# Patient Record
Sex: FEMALE | Race: BLACK OR AFRICAN AMERICAN | NOT HISPANIC OR LATINO | Employment: UNEMPLOYED | URBAN - METROPOLITAN AREA
[De-identification: names, ages, dates, MRNs, and addresses within clinical notes are randomized per-mention and may not be internally consistent; named-entity substitution may affect disease eponyms.]

---

## 2018-01-19 ENCOUNTER — OFFICE VISIT (OUTPATIENT)
Dept: PULMONOLOGY | Facility: CLINIC | Age: 58
End: 2018-01-19

## 2018-01-19 VITALS
OXYGEN SATURATION: 98 % | TEMPERATURE: 97.9 F | RESPIRATION RATE: 16 BRPM | WEIGHT: 205.6 LBS | HEIGHT: 62 IN | DIASTOLIC BLOOD PRESSURE: 90 MMHG | BODY MASS INDEX: 37.84 KG/M2 | HEART RATE: 70 BPM | SYSTOLIC BLOOD PRESSURE: 146 MMHG

## 2018-01-19 DIAGNOSIS — J45.41 MODERATE PERSISTENT ASTHMA WITH ACUTE EXACERBATION IN ADULT: ICD-10-CM

## 2018-01-19 DIAGNOSIS — R06.02 SOB (SHORTNESS OF BREATH): ICD-10-CM

## 2018-01-19 DIAGNOSIS — R05.8 COUGH WITH SPUTUM: Primary | ICD-10-CM

## 2018-01-19 DIAGNOSIS — Z78.9 NONSMOKER: ICD-10-CM

## 2018-01-19 DIAGNOSIS — J30.89 CHRONIC NON-SEASONAL ALLERGIC RHINITIS, UNSPECIFIED TRIGGER: ICD-10-CM

## 2018-01-19 LAB
ALBUMIN SERPL-MCNC: 3.9 G/DL (ref 3.2–4.8)
ALBUMIN/GLOB SERPL: 1.3 G/DL (ref 1.5–2.5)
ALP SERPL-CCNC: 89 U/L (ref 25–100)
ALT SERPL W P-5'-P-CCNC: 30 U/L (ref 7–40)
ANION GAP SERPL CALCULATED.3IONS-SCNC: 7 MMOL/L (ref 3–11)
AST SERPL-CCNC: 21 U/L (ref 0–33)
BASOPHILS # BLD AUTO: 0.02 10*3/MM3 (ref 0–0.2)
BASOPHILS NFR BLD AUTO: 0.3 % (ref 0–1)
BILIRUB SERPL-MCNC: 0.4 MG/DL (ref 0.3–1.2)
BNP SERPL-MCNC: 32 PG/ML (ref 0–100)
BUN BLD-MCNC: 9 MG/DL (ref 9–23)
BUN/CREAT SERPL: 12.9 (ref 7–25)
CALCIUM SPEC-SCNC: 9.5 MG/DL (ref 8.7–10.4)
CHLORIDE SERPL-SCNC: 107 MMOL/L (ref 99–109)
CO2 SERPL-SCNC: 27 MMOL/L (ref 20–31)
CREAT BLD-MCNC: 0.7 MG/DL (ref 0.6–1.3)
DEPRECATED RDW RBC AUTO: 50.3 FL (ref 37–54)
EOSINOPHIL # BLD AUTO: 0.21 10*3/MM3 (ref 0–0.3)
EOSINOPHIL NFR BLD AUTO: 3.3 % (ref 0–3)
ERYTHROCYTE [DISTWIDTH] IN BLOOD BY AUTOMATED COUNT: 21.1 % (ref 11.3–14.5)
GFR SERPL CREATININE-BSD FRML MDRD: 104 ML/MIN/1.73
GLOBULIN UR ELPH-MCNC: 3 GM/DL
GLUCOSE BLD-MCNC: 92 MG/DL (ref 70–100)
HCT VFR BLD AUTO: 36.6 % (ref 34.5–44)
HGB BLD-MCNC: 11.2 G/DL (ref 11.5–15.5)
HYPOCHROMIA BLD QL: NORMAL
IMM GRANULOCYTES # BLD: 0.02 10*3/MM3 (ref 0–0.03)
IMM GRANULOCYTES NFR BLD: 0.3 % (ref 0–0.6)
LYMPHOCYTES # BLD AUTO: 2.29 10*3/MM3 (ref 0.6–4.8)
LYMPHOCYTES NFR BLD AUTO: 35.7 % (ref 24–44)
MCH RBC QN AUTO: 20.2 PG (ref 27–31)
MCHC RBC AUTO-ENTMCNC: 30.6 G/DL (ref 32–36)
MCV RBC AUTO: 66.1 FL (ref 80–99)
MICROCYTES BLD QL: NORMAL
MONOCYTES # BLD AUTO: 0.33 10*3/MM3 (ref 0–1)
MONOCYTES NFR BLD AUTO: 5.1 % (ref 0–12)
NEUTROPHILS # BLD AUTO: 3.54 10*3/MM3 (ref 1.5–8.3)
NEUTROPHILS NFR BLD AUTO: 55.3 % (ref 41–71)
PLAT MORPH BLD: NORMAL
PLATELET # BLD AUTO: 294 10*3/MM3 (ref 150–450)
POTASSIUM BLD-SCNC: 3.7 MMOL/L (ref 3.5–5.5)
PROT SERPL-MCNC: 6.9 G/DL (ref 5.7–8.2)
RBC # BLD AUTO: 5.54 10*6/MM3 (ref 3.89–5.14)
SODIUM BLD-SCNC: 141 MMOL/L (ref 132–146)
WBC MORPH BLD: NORMAL
WBC NRBC COR # BLD: 6.41 10*3/MM3 (ref 3.5–10.8)

## 2018-01-19 PROCEDURE — 82164 ANGIOTENSIN I ENZYME TEST: CPT | Performed by: NURSE PRACTITIONER

## 2018-01-19 PROCEDURE — 86003 ALLG SPEC IGE CRUDE XTRC EA: CPT | Performed by: NURSE PRACTITIONER

## 2018-01-19 PROCEDURE — 86635 COCCIDIOIDES ANTIBODY: CPT | Performed by: NURSE PRACTITIONER

## 2018-01-19 PROCEDURE — 86038 ANTINUCLEAR ANTIBODIES: CPT | Performed by: NURSE PRACTITIONER

## 2018-01-19 PROCEDURE — 94375 RESPIRATORY FLOW VOLUME LOOP: CPT | Performed by: NURSE PRACTITIONER

## 2018-01-19 PROCEDURE — 94729 DIFFUSING CAPACITY: CPT | Performed by: NURSE PRACTITIONER

## 2018-01-19 PROCEDURE — 86200 CCP ANTIBODY: CPT | Performed by: NURSE PRACTITIONER

## 2018-01-19 PROCEDURE — 94726 PLETHYSMOGRAPHY LUNG VOLUMES: CPT | Performed by: NURSE PRACTITIONER

## 2018-01-19 PROCEDURE — 86612 BLASTOMYCES ANTIBODY: CPT | Performed by: NURSE PRACTITIONER

## 2018-01-19 PROCEDURE — 86431 RHEUMATOID FACTOR QUANT: CPT | Performed by: NURSE PRACTITIONER

## 2018-01-19 PROCEDURE — 83880 ASSAY OF NATRIURETIC PEPTIDE: CPT | Performed by: NURSE PRACTITIONER

## 2018-01-19 PROCEDURE — 86606 ASPERGILLUS ANTIBODY: CPT | Performed by: NURSE PRACTITIONER

## 2018-01-19 PROCEDURE — 85025 COMPLETE CBC W/AUTO DIFF WBC: CPT | Performed by: NURSE PRACTITIONER

## 2018-01-19 PROCEDURE — 99205 OFFICE O/P NEW HI 60 MIN: CPT | Performed by: NURSE PRACTITIONER

## 2018-01-19 PROCEDURE — 80053 COMPREHEN METABOLIC PANEL: CPT | Performed by: NURSE PRACTITIONER

## 2018-01-19 PROCEDURE — 86698 HISTOPLASMA ANTIBODY: CPT | Performed by: NURSE PRACTITIONER

## 2018-01-19 PROCEDURE — 85007 BL SMEAR W/DIFF WBC COUNT: CPT | Performed by: NURSE PRACTITIONER

## 2018-01-19 PROCEDURE — 82785 ASSAY OF IGE: CPT | Performed by: NURSE PRACTITIONER

## 2018-01-19 RX ORDER — ALBUTEROL SULFATE 2.5 MG/3ML
SOLUTION RESPIRATORY (INHALATION)
Refills: 1 | COMMUNITY
Start: 2018-01-04

## 2018-01-19 RX ORDER — ALBUTEROL SULFATE 90 UG/1
AEROSOL, METERED RESPIRATORY (INHALATION) 4 TIMES DAILY PRN
Refills: 1 | COMMUNITY
Start: 2018-01-04

## 2018-01-19 RX ORDER — AMITRIPTYLINE HYDROCHLORIDE 25 MG/1
25 TABLET, FILM COATED ORAL NIGHTLY PRN
COMMUNITY

## 2018-01-19 RX ORDER — TRIAMCINOLONE ACETONIDE 1 MG/G
CREAM TOPICAL 2 TIMES DAILY
COMMUNITY

## 2018-01-19 RX ORDER — NAPROXEN 500 MG/1
TABLET ORAL 2 TIMES DAILY WITH MEALS
Refills: 1 | COMMUNITY
Start: 2017-10-26

## 2018-01-19 RX ORDER — HYDROCHLOROTHIAZIDE 25 MG/1
1 TABLET ORAL DAILY
COMMUNITY
Start: 2014-08-26

## 2018-01-19 RX ORDER — MONTELUKAST SODIUM 10 MG/1
10 TABLET ORAL NIGHTLY
Qty: 30 TABLET | Refills: 11 | Status: SHIPPED | OUTPATIENT
Start: 2018-01-19

## 2018-01-19 RX ORDER — SPIRONOLACTONE 50 MG/1
1 TABLET, FILM COATED ORAL 2 TIMES DAILY
Refills: 0 | COMMUNITY
Start: 2018-01-04

## 2018-01-19 RX ORDER — CLOTRIMAZOLE 1 %
CREAM (GRAM) TOPICAL 2 TIMES DAILY
COMMUNITY

## 2018-01-19 RX ORDER — AMLODIPINE BESYLATE 10 MG/1
1 TABLET ORAL DAILY
Refills: 0 | COMMUNITY
Start: 2017-10-24

## 2018-01-19 RX ORDER — METOPROLOL SUCCINATE 100 MG/1
150 TABLET, EXTENDED RELEASE ORAL 2 TIMES DAILY
COMMUNITY
End: 2019-09-17 | Stop reason: ALTCHOICE

## 2018-01-19 RX ORDER — THYROID 30 MG
1 TABLET ORAL DAILY
Refills: 0 | COMMUNITY
Start: 2017-12-26

## 2018-01-19 RX ORDER — PREDNISONE 20 MG/1
TABLET ORAL
Qty: 14 TABLET | Refills: 1 | Status: SHIPPED | OUTPATIENT
Start: 2018-01-19 | End: 2018-06-15

## 2018-01-19 RX ORDER — AZITHROMYCIN 250 MG/1
TABLET, FILM COATED ORAL
Qty: 15 TABLET | Refills: 0 | Status: SHIPPED | OUTPATIENT
Start: 2018-01-19 | End: 2018-10-26

## 2018-01-19 RX ORDER — LEVOCETIRIZINE DIHYDROCHLORIDE 5 MG/1
5 TABLET, FILM COATED ORAL EVERY EVENING
COMMUNITY

## 2018-01-19 RX ORDER — HYDRALAZINE HYDROCHLORIDE 100 MG/1
100 TABLET, FILM COATED ORAL 3 TIMES DAILY
COMMUNITY

## 2018-01-19 RX ORDER — FLUTICASONE PROPIONATE 50 MCG
SPRAY, SUSPENSION (ML) NASAL
Qty: 1 BOTTLE | Refills: 10 | Status: SHIPPED | OUTPATIENT
Start: 2018-01-19

## 2018-01-19 RX ORDER — RANITIDINE 150 MG/1
TABLET ORAL 2 TIMES DAILY
Refills: 0 | COMMUNITY
Start: 2018-01-03

## 2018-01-19 RX ORDER — FLUTICASONE PROPIONATE 50 MCG
2 SPRAY, SUSPENSION (ML) NASAL DAILY
COMMUNITY
End: 2018-03-13

## 2018-01-19 RX ORDER — PREDNISONE 10 MG/1
TABLET ORAL
Refills: 0 | COMMUNITY
Start: 2018-01-12 | End: 2018-06-15

## 2018-01-19 RX ORDER — NITROGLYCERIN 0.4 MG/1
0.4 TABLET SUBLINGUAL AS NEEDED
COMMUNITY

## 2018-01-19 NOTE — PROGRESS NOTES
"[facility] Pulmonary NEW PATIENT     REFERRING DR:     CHIEF COMPLAINT    Chronic cough  Sputum production  Lifelong nonsmoker    HISTORY OF PRESENT ILLNESS    Ivette Payne is a 57 y.o.female here today for referral for chronic cough, probably due to asthma, with frequent prednisone and antibiotic use.    She is a lifelong nonsmoker.  She started having difficulty last year with dyspnea wheezing and coughing.  The cough is always loose and she always produces mostly clear sputum.  She's never had hemoptysis, she does feel like she has chest tightness as well.      She was given Symbicort 80 and just started this 7 days ago, she thinks that helps more than anything.  She does have albuterol nebulizers and a rescue inhaler.  He do help when she has chest tightness.  The cold air makes it worse.  Some days she'll do nebulizers with albuterol 4 times a day.  If she wakes up at night coughing and wheezing she'll use the nebulizer and this helps alleviate her symptoms.      She had a CT the chest recently.  IV contrast noted below, it revealed a stable nodule anterior to the cricoid cartilage at 1.4 cm, unchanged from May 2017.  There was some question as to a nodule in her lung, from what I can tell the dictation says \"the previously question right hilar lymph node is not well appreciated on today's exam\"    CT revealed a small sliding type hiatal hernia.  She herself denies symptoms of indigestion or dysphagia.  She does not take medication for reflux.    She does have a history of taking Nitrostat for chest pain last March, however she does not have chest pain now, has no history of CAD though it runs in her family.  She has mild lower extremity edema, hypertension, no palpitations.  She's not had to be followed by cardiology in the past.    She has a lot of nasal drainage, she was on Flonase but not regularly in the past.  Currently she is not taking anything.  She used Claritin once but this did not help.  She has " seen an allergist and apparently they recommended immunotherapy,    She's had exacerbations and ER visits usually [facility], they give her 2-3 treatments of the time which helped.  She has been on prednisone, she finished this 2 days ago, by her report when she is on prednisone she does not cough, wheeze, or have any dyspnea.    Her most recent antibiotic was 10 days she thinks of doxycycline.    Patient Active Problem List   Diagnosis   • SOB (shortness of breath)   • Cough with sputum   • Asthma in adult   • Nonsmoker   • Chronic allergic rhinitis       Allergies   Allergen Reactions   • Lortab [Hydrocodone-Acetaminophen] Hives   • Percocet [Oxycodone-Acetaminophen] Hives   • Bactrim [Sulfamethoxazole-Trimethoprim] Hives and Rash   • Penicillins Rash       Current Outpatient Prescriptions:   •  albuterol (PROVENTIL) (2.5 MG/3ML) 0.083% nebulizer solution, , Disp: , Rfl: 1  •  amitriptyline (ELAVIL) 25 MG tablet, Take 25 mg by mouth At Night As Needed for Sleep., Disp: , Rfl:   •  amLODIPine (NORVASC) 10 MG tablet, 1 tablet Daily., Disp: , Rfl: 0  •  ARMOUR THYROID 300 MG tablet, 1 tablet Daily., Disp: , Rfl: 0  •  aspirin 81 MG tablet, Take 81 mg by mouth Daily., Disp: , Rfl:   •  clotrimazole (LOTRIMIN) 1 % cream, Apply  topically 2 (Two) Times a Day., Disp: , Rfl:   •  fluticasone (FLONASE) 50 MCG/ACT nasal spray, 2 sprays into each nostril Daily., Disp: , Rfl:   •  hydrALAZINE (APRESOLINE) 100 MG tablet, Take 100 mg by mouth 3 (Three) Times a Day., Disp: , Rfl:   •  hydrochlorothiazide (HYDRODIURIL) 25 MG tablet, Take 1 tablet by mouth Daily., Disp: , Rfl:   •  levocetirizine (XYZAL) 5 MG tablet, Take 5 mg by mouth Every Evening., Disp: , Rfl:   •  metoprolol succinate XL (TOPROL-XL) 100 MG 24 hr tablet, Take 150 mg by mouth 2 (Two) Times a Day., Disp: , Rfl:   •  naproxen (NAPROSYN) 500 MG tablet, 2 (Two) Times a Day With Meals., Disp: , Rfl: 1  •  nitroglycerin (NITROSTAT) 0.4 MG SL tablet, Place 0.4 mg under  the tongue As Needed for Chest Pain. Take no more than 3 doses in 15 minutes., Disp: , Rfl:   •  predniSONE (DELTASONE) 10 MG tablet, take 4 tablets by mouth once daily for 2 days then take 3 tablets...  (REFER TO PRESCRIPTION NOTES)., Disp: , Rfl: 0  •  raNITIdine (ZANTAC) 150 MG tablet, 2 (Two) Times a Day., Disp: , Rfl: 0  •  spironolactone (ALDACTONE) 50 MG tablet, 1 tablet 2 (Two) Times a Day., Disp: , Rfl: 0  •  triamcinolone (KENALOG) 0.1 % cream, Apply  topically 2 (Two) Times a Day., Disp: , Rfl:   •  VENTOLIN  (90 Base) MCG/ACT inhaler, 4 (Four) Times a Day As Needed., Disp: , Rfl: 1  •  azithromycin (ZITHROMAX) 250 MG tablet, Take 2 tablets qd x 1 day, then 1 tab qd x 13 day, Disp: 15 tablet, Rfl: 0  •  fluticasone (FLONASE) 50 MCG/ACT nasal spray, Administer 2 sprays in each nostril DAILY, Disp: 1 bottle, Rfl: 10  •  mometasone-formoterol (DULERA 200) 200-5 MCG/ACT inhaler, 2 PUFFS AM AND PM ; RINSE AND SPIT AFTER USE, Disp: 13 g, Rfl: 11  •  montelukast (SINGULAIR) 10 MG tablet, Take 1 tablet by mouth Every Night., Disp: 30 tablet, Rfl: 11  •  predniSONE (DELTASONE) 20 MG tablet, 2 PILLS DAILY X 4 DAYS, THEN 1 DAILY X 6 DAYS, Disp: 14 tablet, Rfl: 1  •  Tiotropium Bromide Monohydrate (SPIRIVA RESPIMAT) 1.25 MCG/ACT aerosol solution inhaler, Inhale 2 puffs Daily., Disp: 1 inhaler, Rfl: 11  MEDICATION LIST AND ALLERGIES REVIEWED.    Social History   Substance Use Topics   • Smoking status: Never Smoker   • Smokeless tobacco: Never Used   • Alcohol use No       FAMILY AND SOCIAL HISTORY REVIEWED.    Review of Systems   Constitutional: Negative for chills, fatigue and fever.   HENT: Positive for postnasal drip. Negative for nosebleeds, sore throat and trouble swallowing.    Eyes: Negative.  Negative for pain and redness.   Respiratory: Positive for apnea, cough, shortness of breath and wheezing. Negative for chest tightness and stridor.    Cardiovascular: Positive for leg swelling. Negative for chest  "pain and palpitations.   Gastrointestinal: Negative for abdominal distention, abdominal pain and nausea.   Endocrine: Negative for cold intolerance and heat intolerance.   Genitourinary: Negative for difficulty urinating, dysuria, flank pain and hematuria.   Musculoskeletal: Positive for arthralgias, back pain and myalgias.   Skin: Negative for color change and rash.   Neurological: Negative for dizziness, syncope, weakness and numbness.   Hematological: Negative for adenopathy. Does not bruise/bleed easily.   Psychiatric/Behavioral: Positive for sleep disturbance. Negative for agitation, behavioral problems and confusion.   .    /90 (BP Location: Right arm, Patient Position: Sitting, Cuff Size: Large Adult)  Pulse 70  Temp 97.9 °F (36.6 °C)  Resp 16  Ht 157.5 cm (62\")  Wt 93.3 kg (205 lb 9.6 oz)  SpO2 98% Comment: RA  BMI 37.6 kg/m2  Physical Exam   Constitutional: She is oriented to person, place, and time. Vital signs are normal. She appears well-developed. She is cooperative.  Non-toxic appearance. No distress.   HENT:   Head: Normocephalic. Head is without abrasion and without contusion.   Mouth/Throat: Oropharynx is clear and moist and mucous membranes are normal.   Eyes: Conjunctivae are normal. Pupils are equal, round, and reactive to light.   Neck: Trachea normal and normal range of motion. No JVD present. No tracheal deviation present. No thyroid mass and no thyromegaly present.   Cardiovascular: Normal rate, regular rhythm and normal heart sounds.  Exam reveals no gallop.    No murmur heard.  MILD BLE EDEMA; NO VENOUS CORDS, CLUBBING, OR CALF TENDERNESS   Pulmonary/Chest: Effort normal. No stridor. No respiratory distress. She has no wheezes. She has no rales. She exhibits no tenderness.   Abdominal: Soft. She exhibits no ascites. There is no hepatosplenomegaly. There is no tenderness.   Lymphadenopathy:        Head (right side): No submandibular adenopathy present.        Head (left side): " No submandibular adenopathy present.     She has no cervical adenopathy.        Right: No supraclavicular adenopathy present.        Left: No supraclavicular adenopathy present.   Neurological: She is alert and oriented to person, place, and time. She has normal strength.   Skin: Skin is warm and dry. No abrasion and no rash noted.   Psychiatric: She has a normal mood and affect. Her speech is normal and behavior is normal. Cognition and memory are normal.       RESULTS    Chest x-ray PA and lateral obtained in the office today: No prior films to compare, no consolidations or effusions.  Costophrenic angles are clear.    CT scan [facility] the chest [facility] 1/18/[facility]: Stable nodule anterior to the cricoid cartilage, measuring 1.4 cm unchanged from 5/2017.  Postop changes of prior thyroidectomy and a sliding type hiatal hernia, the previously questioned right hilar lymph node was not well appreciated.    No obstruction; ratio is 71%. MVV slightly reduced; TLC normal at 84%; diffusion slightly reduced at 66%, adjusting to normal with alveolar volume failure    PROBLEM LIST     Cough with sputum---  Probably due to asthma, symptomatic improvement on Symbicort     Relevant Orders    CBC & Differential    BNP    Fungal Antibodies, Quant    IgE    Comprehensive Metabolic Panel    Allergens, Zone 8    Angiotensin Converting Enzyme    Rheumatoid Factor, Quant    JODEE With / DsDNA, RNP, Sjogrens A / B, Smith    Cyclic Citrul Peptide Antibody, IgG / IgA    CBC Auto Differential    SOB (shortness of breath)    Lifelong Nonsmoker    Hiatial Hernia    Nodule on Cricoid--noted on CT the chest, she has a history of prior thyroidectomy.                   DISCUSSION      All of the below medication instructions were written down:  --Increase the inhaled steroid, she was given samples of Symbicort 160; Dulera 200, as Dulera is cheaper.  She can use 2 puffs morning and night of either but does not use them together as they're the  same  --Stop the Symbicort 80  --Added Spiriva, 2 puffs daily of the 1.25 dose, she was given samples and a demonstration on its use    Prescription for antibiotic to dry up the nasal drainage and sputum  Prescription sent for prednisone not for her to take now, but to have as needed if she develops wheezing at home    Continue the albuterol nebulizers or HFA as needed    For the nasal drainage as she does have a large amount in the back of her throat on exam today,   --Flonase 2 sprays each nostril daily,  --Singulair take 1 daily    No eating or drinking 2 hours before lying down at night.  DO NOT DRINK WATER THRUOGH NIGHT; IF MOUTH DRY RINSE AND SPIT; DO NOT GET UP TO EAT OR DRINK THROUGH THE NIGHT. Use blocks or bricks to increase head of the bed 30°.      Lab work drawn today as noted above, which we will review when she returns and we did discuss the possibility of NUCALLA OR XOLAIR if lab work reveals that she is a candidate for these, we talked about the diagnosis of asthma and the lack of obstruction on today's pulmonary function testing.  I pointed out there is no evidence of asthma on today's pulmonary function studies, however it does sound like she's had significant wheezing that has responded to prednisone, Symbicort and albuterol, I hesitate to do a methacholine challenge given that her allergy testing was positive and immunotherapy was recommended.    Discussed diagnostic testing, the importance of treatment compliance, we reviewed the written instructions that were given regarding medication use.  We discussed risk factor reduction such as GERD precautions, early recognition of worsening symptoms, avoiding things that trigger symptoms including cigarette smoke, environmental irritants etc.; and also discussed possible future recommended diagnostic studies and the necessity of future follow-up.  In the future we can consider an upper GI given a hiatal hernia and the possibility of silent reflux  contributing to her symptoms, consider CT of the sinuses, consider overnight pulse oximetry as well for sleep study if she    Extended visit today, 60 minutes, 40 minutes spent face-to-face counseling, education, discussion and/or coordination of care as listed in mentioned above.    She'll follow-up in 3-4 weeks with spirometry,and was told to call and given extensions 104 if needed for an earlier visit if problems arise prior to the scheduled followup.      ROMERO Estrella  01/19/201810:10 AM  Electronically signed     Please note that portions of this note were completed with a voice recognition program. Efforts were made to edit the dictations, but occasionally words are mistranscribed.      CC: ROMERO Estrella

## 2018-01-22 LAB
ACE SERPL-CCNC: 34 U/L (ref 14–82)
ANA SER QL: NEGATIVE
CCP IGA+IGG SERPL IA-ACNC: 7 UNITS (ref 0–19)
RHEUMATOID FACT SERPL-ACNC: NEGATIVE [IU]/ML

## 2018-01-24 PROBLEM — J30.9 CHRONIC ALLERGIC RHINITIS: Status: ACTIVE | Noted: 2018-01-24

## 2018-01-24 PROBLEM — J45.909 ASTHMA IN ADULT: Status: ACTIVE | Noted: 2018-01-24

## 2018-01-24 PROBLEM — R06.02 SOB (SHORTNESS OF BREATH): Status: ACTIVE | Noted: 2018-01-24

## 2018-01-24 PROBLEM — Z78.9 NONSMOKER: Status: ACTIVE | Noted: 2018-01-24

## 2018-01-24 PROBLEM — R05.8 COUGH WITH SPUTUM: Status: ACTIVE | Noted: 2018-01-24

## 2018-01-24 LAB
A ALTERNATA IGE QN: <0.1 KU/L
A FUMIGATUS IGE QN: <0.1 KU/L
AMER ROACH IGE QN: <0.1 KU/L
BAHIA GRASS IGE QN: <0.1 KU/L
BERMUDA GRASS IGE QN: <0.1 KU/L
BOXELDER IGE QN: <0.1 KU/L
C HERBARUM IGE QN: <0.1 KU/L
CAT DANDER IGG QN: <0.1 KU/L
CMN PIGWEED IGE QN: <0.1 KU/L
COMMON RAGWEED IGE QN: <0.1 KU/L
CONV CLASS DESCRIPTION: NORMAL
D FARINAE IGE QN: <0.1 KU/L
D PTERONYSS IGE QN: <0.1 KU/L
DOG DANDER IGE QN: <0.1 KU/L
ENGL PLANTAIN IGE QN: <0.1 KU/L
HAZELNUT POLN IGE QN: <0.1 KU/L
JOHNSON GRASS IGE QN: <0.1 KU/L
KENT BLUE GRASS IGE QN: <0.1 KU/L
M RACEMOSUS IGE QN: <0.1 KU/L
MT JUNIPER IGE QN: <0.1 KU/L
MUGWORT IGE QN: <0.1 KU/L
NETTLE IGE QN: <0.1 KU/L
P NOTATUM IGE QN: <0.1 KU/L
S BOTRYOSUM IGE QN: <0.1 KU/L
SHEEP SORREL IGE QN: <0.1 KU/L
SWEET GUM IGE QN: <0.1 KU/L
T011-IGE MAPLE LEAF SYCAMORE: <0.1 KU/L
WHITE ELM IGE QN: <0.1 KU/L
WHITE HICKORY IGE QN: <0.1 KU/L
WHITE MULBERRY IGE QN: <0.1 KU/L
WHITE OAK IGE QN: <0.1 KU/L

## 2018-01-25 LAB
A FLAVUS AB SER QL ID: NEGATIVE
A FUMIGATUS AB SER QL ID: NEGATIVE
A NIGER AB SER QL ID: NEGATIVE
B DERMAT AB TITR SER: NEGATIVE {TITER}
C IMMITIS AB TITR SER ID: NORMAL {TITER}
H CAPSUL AB TITR SER ID: NEGATIVE {TITER}
TOTAL IGE SMQN RAST: 198 IU/ML (ref 0–100)

## 2018-03-13 ENCOUNTER — OFFICE VISIT (OUTPATIENT)
Dept: PULMONOLOGY | Facility: CLINIC | Age: 58
End: 2018-03-13

## 2018-03-13 VITALS
DIASTOLIC BLOOD PRESSURE: 82 MMHG | SYSTOLIC BLOOD PRESSURE: 130 MMHG | WEIGHT: 205 LBS | BODY MASS INDEX: 38.71 KG/M2 | TEMPERATURE: 98.6 F | HEIGHT: 61 IN | OXYGEN SATURATION: 98 % | HEART RATE: 66 BPM

## 2018-03-13 DIAGNOSIS — R06.02 SOB (SHORTNESS OF BREATH): Primary | ICD-10-CM

## 2018-03-13 DIAGNOSIS — Z78.9 NONSMOKER: ICD-10-CM

## 2018-03-13 DIAGNOSIS — J30.1 CHRONIC ALLERGIC RHINITIS DUE TO POLLEN, UNSPECIFIED SEASONALITY: ICD-10-CM

## 2018-03-13 DIAGNOSIS — R05.8 COUGH WITH SPUTUM: ICD-10-CM

## 2018-03-13 DIAGNOSIS — J45.21 MILD INTERMITTENT ASTHMA WITH ACUTE EXACERBATION IN ADULT: ICD-10-CM

## 2018-03-13 PROCEDURE — 99214 OFFICE O/P EST MOD 30 MIN: CPT | Performed by: NURSE PRACTITIONER

## 2018-03-13 PROCEDURE — 94060 EVALUATION OF WHEEZING: CPT | Performed by: NURSE PRACTITIONER

## 2018-03-13 RX ORDER — ALBUTEROL SULFATE 90 UG/1
4 AEROSOL, METERED RESPIRATORY (INHALATION) ONCE
Status: COMPLETED | OUTPATIENT
Start: 2018-03-13 | End: 2018-03-13

## 2018-03-13 RX ADMIN — ALBUTEROL SULFATE 4 PUFF: 90 AEROSOL, METERED RESPIRATORY (INHALATION) at 11:39

## 2018-03-13 NOTE — PROGRESS NOTES
[facility] Pulmonary Follow up    CHIEF COMPLAINT    Chronic cough  Sputum production  Lifelong nonsmoker    HISTORY OF PRESENT ILLNESS    Ivette Payne is a 57 y.o.female here today for follow-up.  I saw her last month for referral for chronic cough, with frequent exacerbations probably due to asthma.  She is a lifelong nonsmoker.  She had recently been started on Symbicort 80 when I saw her and thinks it was helping significantly.    When I saw her we increase the Symbicort 160, and added Spiriva, she was given an antibiotic because of increasing nasal drainage and a productive cough.  And she was to start Flonase and Singulair as she had a large amount of nasal drainage in the back of her throat.    She was not obstructed when I saw her, however had such improvement on the Symbicort after only 7 days I kept her on this and increased the inhaled steroid given her coughing and still somewhat frequent albuterol use.    She is having a lot of sinus drainage over the past couple days but is been using Flonase and Singulair daily which has helped.  No epistaxis.  No fevers or chills.    She does like the Symbicort is helped her, she denies much of a cough or sputum at this point with the exception of a cough in the morning from sinus drainage.    She still has some exertional dyspnea, but it's mild.  No significant wheezing.    Patient Active Problem List   Diagnosis   • SOB (shortness of breath)   • Cough with sputum   • Asthma in adult   • Nonsmoker   • Chronic allergic rhinitis       Allergies   Allergen Reactions   • Lortab [Hydrocodone-Acetaminophen] Hives   • Percocet [Oxycodone-Acetaminophen] Hives   • Bactrim [Sulfamethoxazole-Trimethoprim] Hives and Rash   • Penicillins Rash       Current Outpatient Prescriptions:   •  albuterol (PROVENTIL) (2.5 MG/3ML) 0.083% nebulizer solution, , Disp: , Rfl: 1  •  amitriptyline (ELAVIL) 25 MG tablet, Take 25 mg by mouth At Night As Needed for Sleep., Disp: , Rfl:   •   amLODIPine (NORVASC) 10 MG tablet, 1 tablet Daily., Disp: , Rfl: 0  •  ARMOUR THYROID 300 MG tablet, 1 tablet Daily., Disp: , Rfl: 0  •  aspirin 81 MG tablet, Take 81 mg by mouth Daily., Disp: , Rfl:   •  azithromycin (ZITHROMAX) 250 MG tablet, Take 2 tablets qd x 1 day, then 1 tab qd x 13 day, Disp: 15 tablet, Rfl: 0  •  clotrimazole (LOTRIMIN) 1 % cream, Apply  topically 2 (Two) Times a Day., Disp: , Rfl:   •  fluticasone (FLONASE) 50 MCG/ACT nasal spray, Administer 2 sprays in each nostril DAILY, Disp: 1 bottle, Rfl: 10  •  hydrALAZINE (APRESOLINE) 100 MG tablet, Take 100 mg by mouth 3 (Three) Times a Day., Disp: , Rfl:   •  levocetirizine (XYZAL) 5 MG tablet, Take 5 mg by mouth Every Evening., Disp: , Rfl:   •  metoprolol succinate XL (TOPROL-XL) 100 MG 24 hr tablet, Take 150 mg by mouth 2 (Two) Times a Day., Disp: , Rfl:   •  mometasone-formoterol (DULERA 200) 200-5 MCG/ACT inhaler, 2 PUFFS AM AND PM ; RINSE AND SPIT AFTER USE, Disp: 13 g, Rfl: 11  •  montelukast (SINGULAIR) 10 MG tablet, Take 1 tablet by mouth Every Night., Disp: 30 tablet, Rfl: 11  •  naproxen (NAPROSYN) 500 MG tablet, 2 (Two) Times a Day With Meals., Disp: , Rfl: 1  •  nitroglycerin (NITROSTAT) 0.4 MG SL tablet, Place 0.4 mg under the tongue As Needed for Chest Pain. Take no more than 3 doses in 15 minutes., Disp: , Rfl:   •  raNITIdine (ZANTAC) 150 MG tablet, 2 (Two) Times a Day., Disp: , Rfl: 0  •  spironolactone (ALDACTONE) 50 MG tablet, 1 tablet 2 (Two) Times a Day., Disp: , Rfl: 0  •  Tiotropium Bromide Monohydrate (SPIRIVA RESPIMAT) 1.25 MCG/ACT aerosol solution inhaler, Inhale 2 puffs Daily., Disp: 1 inhaler, Rfl: 11  •  triamcinolone (KENALOG) 0.1 % cream, Apply  topically 2 (Two) Times a Day., Disp: , Rfl:   •  VENTOLIN  (90 Base) MCG/ACT inhaler, 4 (Four) Times a Day As Needed., Disp: , Rfl: 1  •  hydrochlorothiazide (HYDRODIURIL) 25 MG tablet, Take 1 tablet by mouth Daily., Disp: , Rfl:   •  predniSONE (DELTASONE) 10 MG  "tablet, take 4 tablets by mouth once daily for 2 days then take 3 tablets...  (REFER TO PRESCRIPTION NOTES)., Disp: , Rfl: 0  •  predniSONE (DELTASONE) 20 MG tablet, 2 PILLS DAILY X 4 DAYS, THEN 1 DAILY X 6 DAYS, Disp: 14 tablet, Rfl: 1  MEDICATION LIST AND ALLERGIES REVIEWED.    Social History   Substance Use Topics   • Smoking status: Never Smoker   • Smokeless tobacco: Never Used   • Alcohol use No       FAMILY AND SOCIAL HISTORY REVIEWED.    Review of Systems   Constitutional: Negative for activity change, chills, fatigue and fever.   HENT: Positive for postnasal drip. Negative for hearing loss, nosebleeds and sneezing.    Respiratory: Positive for cough. Negative for apnea, chest tightness, shortness of breath, wheezing and stridor.    Cardiovascular: Negative for chest pain, palpitations and leg swelling.        NO INCREASE IN MILD BLE EDEMA; NO CALF TENDERNESS    Gastrointestinal: Negative for abdominal pain, diarrhea and nausea.   Neurological: Negative for dizziness, syncope and light-headedness.   .    /82 (BP Location: Right arm, Patient Position: Sitting, Cuff Size: Adult)   Pulse 66   Temp 98.6 °F (37 °C)   Ht 154.9 cm (61\")   Wt 93 kg (205 lb)   SpO2 98%   BMI 38.73 kg/m²   Physical Exam   Constitutional: She is oriented to person, place, and time. Vital signs are normal. She appears well-developed. She is cooperative.  Non-toxic appearance. No distress.   HENT:   Head: Normocephalic. Head is without abrasion and without contusion.   Mouth/Throat: Oropharynx is clear and moist and mucous membranes are normal.   Eyes: Conjunctivae are normal. Pupils are equal, round, and reactive to light.   Neck: Trachea normal and normal range of motion. No JVD present. No tracheal deviation present. No thyroid mass and no thyromegaly present.   Cardiovascular: Normal rate, regular rhythm and normal heart sounds.  Exam reveals no gallop and no friction rub.    No murmur heard.  Mild BLE edema; no venous " cords or calf tenderness.    Pulmonary/Chest: Effort normal. No stridor. No respiratory distress. She has no wheezes. She has no rales. She exhibits no tenderness.   Abdominal: Soft. She exhibits no ascites. There is no hepatosplenomegaly. There is no tenderness.   Lymphadenopathy:        Head (right side): No submandibular adenopathy present.        Head (left side): No submandibular adenopathy present.     She has no cervical adenopathy.        Right: No supraclavicular adenopathy present.        Left: No supraclavicular adenopathy present.   Neurological: She is alert and oriented to person, place, and time. She has normal strength.   Skin: Skin is warm and dry. No abrasion and no rash noted. She is not diaphoretic.   Psychiatric: She has a normal mood and affect. Her speech is normal and behavior is normal. Thought content normal. Cognition and memory are normal.       RESULTS    Moderate obstruction prior to bronchodilator therapy, significant 18% increase in FEV1 after therapy, revealing no obstruction after albuterol, after albuterol ratio was 71% and FEV1 was 83%.  Minute ventilation normal.    PROBLEM LIST    Problem List Items Addressed This Visit        Pulmonary Problems    SOB (shortness of breath) - Primary    Relevant Medications    albuterol (PROVENTIL HFA;VENTOLIN HFA) inhaler 4 puff (Completed)    Other Relevant Orders    Spirometry With Bronchodilator (Completed)    Cough with sputum    Asthma in adult    Chronic allergic rhinitis       Other    Nonsmoker      Other Visit Diagnoses    None.           DISCUSSION    Continue current inhalation therapy with Symbicort 160    Continue the Flonase but use it regularly as well as over-the-counter antihistamine as needed but for now she is doing pretty good on daily Singulair    We discussed her lab work, for now she needs to focus on GERD precautions, as I think that's part of what's contributing to her exacerbations.    IgE was mildly elevated, but  because RAST panel was negative she would not be a candidate for Xolair    Eosinophils were not significantly elevated, therefore she would not be a candidate for new Omid    All of her lab work was reviewed and she was given copies.  Autoimmune workup negative.    ROMERO Estrella  03/13/20181:17 PM  Electronically signed     Please note that portions of this note were completed with a voice recognition program. Efforts were made to edit the dictations, but occasionally words are mistranscribed.      CC: ROMERO Estrella

## 2018-06-15 ENCOUNTER — OFFICE VISIT (OUTPATIENT)
Dept: PULMONOLOGY | Facility: CLINIC | Age: 58
End: 2018-06-15

## 2018-06-15 VITALS
WEIGHT: 206 LBS | HEART RATE: 68 BPM | BODY MASS INDEX: 37.91 KG/M2 | DIASTOLIC BLOOD PRESSURE: 82 MMHG | HEIGHT: 62 IN | TEMPERATURE: 98.6 F | SYSTOLIC BLOOD PRESSURE: 130 MMHG | OXYGEN SATURATION: 96 %

## 2018-06-15 DIAGNOSIS — J45.20 MILD INTERMITTENT ASTHMA IN ADULT WITHOUT COMPLICATION: ICD-10-CM

## 2018-06-15 DIAGNOSIS — R06.02 SHORTNESS OF BREATH: Primary | ICD-10-CM

## 2018-06-15 DIAGNOSIS — K21.9 GASTROESOPHAGEAL REFLUX DISEASE, ESOPHAGITIS PRESENCE NOT SPECIFIED: ICD-10-CM

## 2018-06-15 DIAGNOSIS — J30.1 CHRONIC ALLERGIC RHINITIS DUE TO POLLEN, UNSPECIFIED SEASONALITY: ICD-10-CM

## 2018-06-15 PROCEDURE — 99214 OFFICE O/P EST MOD 30 MIN: CPT | Performed by: NURSE PRACTITIONER

## 2018-06-15 PROCEDURE — 94375 RESPIRATORY FLOW VOLUME LOOP: CPT | Performed by: NURSE PRACTITIONER

## 2018-06-15 RX ORDER — IPRATROPIUM BROMIDE 21 UG/1
2 SPRAY, METERED NASAL EVERY 12 HOURS
Qty: 1 EACH | Refills: 12 | Status: SHIPPED | OUTPATIENT
Start: 2018-06-15

## 2018-06-15 NOTE — PROGRESS NOTES
[facility] Pulmonary Follow up    CHIEF COMPLAINT    Asthma, allergic rhinitis    HISTORY OF PRESENT ILLNESS    Ivette Payne is a 57 y.o.female lifetime nonsmoker here today for follow-up on asthma and chronic allergic rhinitis.    She last saw Yana Nunez in March.  She feels like her breathing has been mostly stable since then and she  has not required any antibiotics or prednisone.    She was previously on Symbicort 80 but Yana increased her to Symbicort 160.  That made a significant improvement in her cough and shortness of breath.  She does still occasionally have shortness of breath on exertion such as going up the stairs at her apartment.  High heat and humidity also aggravate her asthma.  Typically she does not have to use her rescue inhaler on a daily basis but has had to use this more often recently with the high temperatures.      She is on Flonase and Singulair but states that some days she still has significant nasal drainage.  She has previously tried OTC antihistamine such as Zyrtec and Claritin with no relief.    She has reflux but her symptoms are well controlled with ranitidine.      Patient Active Problem List   Diagnosis   • SOB (shortness of breath)   • Cough with sputum   • Asthma in adult   • Nonsmoker   • Chronic allergic rhinitis   • GERD (gastroesophageal reflux disease)       Allergies   Allergen Reactions   • Lortab [Hydrocodone-Acetaminophen] Hives   • Percocet [Oxycodone-Acetaminophen] Hives   • Bactrim [Sulfamethoxazole-Trimethoprim] Hives and Rash   • Penicillins Rash       Current Outpatient Prescriptions:   •  albuterol (PROVENTIL) (2.5 MG/3ML) 0.083% nebulizer solution, , Disp: , Rfl: 1  •  amitriptyline (ELAVIL) 25 MG tablet, Take 25 mg by mouth At Night As Needed for Sleep., Disp: , Rfl:   •  amLODIPine (NORVASC) 10 MG tablet, 1 tablet Daily., Disp: , Rfl: 0  •  ARMOUR THYROID 300 MG tablet, 1 tablet Daily., Disp: , Rfl: 0  •  aspirin 81 MG tablet, Take 81 mg by mouth Daily.,  Disp: , Rfl:   •  azithromycin (ZITHROMAX) 250 MG tablet, Take 2 tablets qd x 1 day, then 1 tab qd x 13 day, Disp: 15 tablet, Rfl: 0  •  clotrimazole (LOTRIMIN) 1 % cream, Apply  topically 2 (Two) Times a Day., Disp: , Rfl:   •  fluticasone (FLONASE) 50 MCG/ACT nasal spray, Administer 2 sprays in each nostril DAILY, Disp: 1 bottle, Rfl: 10  •  hydrALAZINE (APRESOLINE) 100 MG tablet, Take 100 mg by mouth 3 (Three) Times a Day., Disp: , Rfl:   •  hydrochlorothiazide (HYDRODIURIL) 25 MG tablet, Take 1 tablet by mouth Daily., Disp: , Rfl:   •  levocetirizine (XYZAL) 5 MG tablet, Take 5 mg by mouth Every Evening., Disp: , Rfl:   •  metoprolol succinate XL (TOPROL-XL) 100 MG 24 hr tablet, Take 150 mg by mouth 2 (Two) Times a Day., Disp: , Rfl:   •  mometasone-formoterol (DULERA 200) 200-5 MCG/ACT inhaler, 2 PUFFS AM AND PM ; RINSE AND SPIT AFTER USE, Disp: 13 g, Rfl: 11  •  montelukast (SINGULAIR) 10 MG tablet, Take 1 tablet by mouth Every Night., Disp: 30 tablet, Rfl: 11  •  naproxen (NAPROSYN) 500 MG tablet, 2 (Two) Times a Day With Meals., Disp: , Rfl: 1  •  nitroglycerin (NITROSTAT) 0.4 MG SL tablet, Place 0.4 mg under the tongue As Needed for Chest Pain. Take no more than 3 doses in 15 minutes., Disp: , Rfl:   •  raNITIdine (ZANTAC) 150 MG tablet, 2 (Two) Times a Day., Disp: , Rfl: 0  •  spironolactone (ALDACTONE) 50 MG tablet, 1 tablet 2 (Two) Times a Day., Disp: , Rfl: 0  •  Tiotropium Bromide Monohydrate (SPIRIVA RESPIMAT) 1.25 MCG/ACT aerosol solution inhaler, Inhale 2 puffs Daily., Disp: 1 inhaler, Rfl: 11  •  triamcinolone (KENALOG) 0.1 % cream, Apply  topically 2 (Two) Times a Day., Disp: , Rfl:   •  VENTOLIN  (90 Base) MCG/ACT inhaler, 4 (Four) Times a Day As Needed., Disp: , Rfl: 1  •  ipratropium (ATROVENT) 0.03 % nasal spray, 2 sprays into each nostril Every 12 (Twelve) Hours., Disp: 1 each, Rfl: 12  •  predniSONE (DELTASONE) 10 MG tablet, take 4 tablets by mouth once daily for 2 days then take 3  "tablets...  (REFER TO PRESCRIPTION NOTES)., Disp: , Rfl: 0  •  predniSONE (DELTASONE) 20 MG tablet, 2 PILLS DAILY X 4 DAYS, THEN 1 DAILY X 6 DAYS, Disp: 14 tablet, Rfl: 1  MEDICATION LIST AND ALLERGIES REVIEWED.    Social History   Substance Use Topics   • Smoking status: Never Smoker   • Smokeless tobacco: Never Used   • Alcohol use No       FAMILY AND SOCIAL HISTORY REVIEWED.    Review of Systems   Constitutional: Negative for chills, fatigue, fever and unexpected weight change.   HENT: Positive for postnasal drip and rhinorrhea. Negative for congestion, nosebleeds, sinus pressure and trouble swallowing.    Respiratory: Positive for cough and shortness of breath. Negative for chest tightness and wheezing.    Cardiovascular: Negative for chest pain and leg swelling.   Gastrointestinal: Negative for abdominal pain, constipation, diarrhea, nausea and vomiting.   Genitourinary: Negative for dysuria, frequency, hematuria and urgency.   Musculoskeletal: Negative for myalgias.   Neurological: Negative for dizziness, weakness, numbness and headaches.   All other systems reviewed and are negative.  .    /82 (BP Location: Right arm, Patient Position: Sitting, Cuff Size: Adult)   Pulse 68   Temp 98.6 °F (37 °C)   Ht 157.5 cm (62\")   Wt 93.4 kg (206 lb)   SpO2 96%   BMI 37.68 kg/m²       There is no immunization history on file for this patient.    Physical Exam   Constitutional: She is oriented to person, place, and time. She appears well-developed. No distress.   HENT:   Head: Normocephalic and atraumatic.   Neck: Normal range of motion. Neck supple.   Cardiovascular: Normal rate, regular rhythm and normal heart sounds.    No murmur heard.  Pulmonary/Chest: Effort normal and breath sounds normal. No stridor. No respiratory distress. She has no wheezes. She has no rales.   Abdominal: Soft.   Musculoskeletal: Normal range of motion. She exhibits no edema.   Neurological: She is alert and oriented to person, place, " and time.   Skin: Skin is warm and dry.   Psychiatric: She has a normal mood and affect. Her behavior is normal.   Vitals reviewed.        RESULTS    PFTS in the office today, read by me: no obstruction    PROBLEM LIST    Problem List Items Addressed This Visit        Respiratory    Asthma in adult    Chronic allergic rhinitis       Digestive    GERD (gastroesophageal reflux disease)      Other Visit Diagnoses     Shortness of breath    -  Primary    Relevant Orders    Spirometry Without Bronchodilator (Completed)            DISCUSSION    This is a pleasant 57-year-old female lifetime nonsmoker with asthma, chronic allergic rhinitis, and GERD.  Her asthma has been well controlled with Symbicort although she does still have occasional dyspnea on exertion.  Her allergic rhinitis persist despite use of Singulair and Flonase.  Previous OTC such as Zyrtec and Claritin have been ineffective.  GERD is well controlled with ranitidine.    Continue Symbicort  Continue Ventolin as needed, premedicate before activity such as going up stairs  Continue Singulair and Flonase  Add Atrovent nasal spray  Continue Ranitidine     She has only seen Yana Nunez and myself so far in the office.  She is interested in establishing care with a physician.  Her son Edwin was a former patient of Dr. Crandall prior to passing away and she is interested in seeing her as well.  She will follow up with Dr. Crandall in 6 months.    I spent 25 minutes with the patient. I spent > 50% percent of this time counseling and discussing diagnosis, diagnostic testing, current status and treatment options.    ROMERO Kim  06/15/855389:27 AM  Electronically signed     Please note that portions of this note were completed with a voice recognition program. Efforts were made to edit the dictations, but occasionally words are mistranscribed.      CC: ROMERO Saldivar

## 2018-10-05 ENCOUNTER — TELEPHONE (OUTPATIENT)
Dept: PULMONOLOGY | Facility: CLINIC | Age: 58
End: 2018-10-05

## 2018-10-05 DIAGNOSIS — J45.901 ASTHMA EXACERBATION, MILD: Primary | ICD-10-CM

## 2018-10-05 RX ORDER — PREDNISONE 10 MG/1
TABLET ORAL
Qty: 31 TABLET | Refills: 0 | Status: SHIPPED | OUTPATIENT
Start: 2018-10-05 | End: 2018-10-26

## 2018-10-05 RX ORDER — DOXYCYCLINE HYCLATE 100 MG
100 TABLET ORAL 2 TIMES DAILY
Qty: 20 TABLET | Refills: 0 | Status: SHIPPED | OUTPATIENT
Start: 2018-10-05 | End: 2018-10-26

## 2018-10-05 NOTE — TELEPHONE ENCOUNTER
Mrs. Payne called stating she has chest tightness, coughing, and no fevers.  She is concerned.  She is using her inhaler's and nebulizer's.  I will call her in a prednisone taper and antibiotic.  She will call on Monday if she is not better, or go to the ED if her breathing worsens over the weekend.

## 2018-10-26 ENCOUNTER — OFFICE VISIT (OUTPATIENT)
Dept: PULMONOLOGY | Facility: CLINIC | Age: 58
End: 2018-10-26

## 2018-10-26 VITALS
DIASTOLIC BLOOD PRESSURE: 100 MMHG | RESPIRATION RATE: 18 BRPM | BODY MASS INDEX: 38.28 KG/M2 | TEMPERATURE: 97.2 F | OXYGEN SATURATION: 97 % | HEIGHT: 62 IN | HEART RATE: 86 BPM | SYSTOLIC BLOOD PRESSURE: 162 MMHG | WEIGHT: 208 LBS

## 2018-10-26 DIAGNOSIS — G47.33 OSA (OBSTRUCTIVE SLEEP APNEA): ICD-10-CM

## 2018-10-26 DIAGNOSIS — Z78.9 NONSMOKER: ICD-10-CM

## 2018-10-26 DIAGNOSIS — K21.9 GASTROESOPHAGEAL REFLUX DISEASE, ESOPHAGITIS PRESENCE NOT SPECIFIED: ICD-10-CM

## 2018-10-26 DIAGNOSIS — R06.02 SOB (SHORTNESS OF BREATH): Primary | ICD-10-CM

## 2018-10-26 PROCEDURE — 99213 OFFICE O/P EST LOW 20 MIN: CPT | Performed by: NURSE PRACTITIONER

## 2018-10-26 RX ORDER — DILTIAZEM HYDROCHLORIDE 60 MG/1
2 TABLET, FILM COATED ORAL 2 TIMES DAILY
Refills: 0 | COMMUNITY
Start: 2018-08-30 | End: 2019-05-03 | Stop reason: ALTCHOICE

## 2018-10-26 RX ORDER — PREDNISONE 10 MG/1
TABLET ORAL
Qty: 30 TABLET | Refills: 0 | Status: SHIPPED | OUTPATIENT
Start: 2018-10-26 | End: 2019-05-03

## 2018-10-26 NOTE — PROGRESS NOTES
"[facility] Pulmonary Follow up    CHIEF COMPLAINT    asthma    HISTORY OF PRESENT ILLNESS    Ivette Payne is a 58 y.o.female here today for follow-up on her asthma.  She has been having asthma flare-ups frequently for the last month.  She was treated with Doxycycline and Prednisone at the beginning of the month for shortness of breath and yellow sputum production.  She states her breathing has improved but over the last week she has been using her albuterol nebulizer's and rescue inhalers every 3-4 hours and she is getting to the maximum usage of these products.  She states she has had a couple of \"asthma attacks\" over the last couple of days.  She quit taking her Symbicort earlier this month because she didn't think she could take the nebulizer's with the Symbicort.      She complains of daytime somnolence, loud snoring and frequent napping during the day.    She denies fever, chills, sputum production, chest pain or palpitations.  She does have chest tightness and wheezing.  She is very concerned that her breathing is getting worse.  She has sinus drainage and takes Xyzal and Singulair for this.  She also take Zantac daily for GERD.  She denies reflux symptoms.      Patient Active Problem List   Diagnosis   • SOB (shortness of breath)   • Cough with sputum   • Asthma in adult   • Nonsmoker   • Chronic allergic rhinitis   • GERD (gastroesophageal reflux disease)   • STEPHANIE (obstructive sleep apnea)       Allergies   Allergen Reactions   • Lortab [Hydrocodone-Acetaminophen] Hives   • Percocet [Oxycodone-Acetaminophen] Hives   • Bactrim [Sulfamethoxazole-Trimethoprim] Hives and Rash   • Penicillins Rash       Current Outpatient Prescriptions:   •  albuterol (PROVENTIL) (2.5 MG/3ML) 0.083% nebulizer solution, , Disp: , Rfl: 1  •  amitriptyline (ELAVIL) 25 MG tablet, Take 25 mg by mouth At Night As Needed for Sleep., Disp: , Rfl:   •  amLODIPine (NORVASC) 10 MG tablet, 1 tablet Daily., Disp: , Rfl: 0  •  ARMOUR THYROID " 300 MG tablet, 1 tablet Daily., Disp: , Rfl: 0  •  aspirin 81 MG tablet, Take 81 mg by mouth Daily., Disp: , Rfl:   •  clotrimazole (LOTRIMIN) 1 % cream, Apply  topically 2 (Two) Times a Day., Disp: , Rfl:   •  fluticasone (FLONASE) 50 MCG/ACT nasal spray, Administer 2 sprays in each nostril DAILY, Disp: 1 bottle, Rfl: 10  •  hydrALAZINE (APRESOLINE) 100 MG tablet, Take 100 mg by mouth 3 (Three) Times a Day., Disp: , Rfl:   •  hydrochlorothiazide (HYDRODIURIL) 25 MG tablet, Take 1 tablet by mouth Daily., Disp: , Rfl:   •  ipratropium (ATROVENT) 0.03 % nasal spray, 2 sprays into each nostril Every 12 (Twelve) Hours., Disp: 1 each, Rfl: 12  •  levocetirizine (XYZAL) 5 MG tablet, Take 5 mg by mouth Every Evening., Disp: , Rfl:   •  metoprolol succinate XL (TOPROL-XL) 100 MG 24 hr tablet, Take 150 mg by mouth 2 (Two) Times a Day., Disp: , Rfl:   •  montelukast (SINGULAIR) 10 MG tablet, Take 1 tablet by mouth Every Night., Disp: 30 tablet, Rfl: 11  •  naproxen (NAPROSYN) 500 MG tablet, 2 (Two) Times a Day With Meals., Disp: , Rfl: 1  •  nitroglycerin (NITROSTAT) 0.4 MG SL tablet, Place 0.4 mg under the tongue As Needed for Chest Pain. Take no more than 3 doses in 15 minutes., Disp: , Rfl:   •  raNITIdine (ZANTAC) 150 MG tablet, 2 (Two) Times a Day., Disp: , Rfl: 0  •  spironolactone (ALDACTONE) 50 MG tablet, 1 tablet 2 (Two) Times a Day., Disp: , Rfl: 0  •  SYMBICORT 80-4.5 MCG/ACT inhaler, Inhale 2 puffs 2 (Two) Times a Day., Disp: , Rfl: 0  •  Tiotropium Bromide Monohydrate (SPIRIVA RESPIMAT) 1.25 MCG/ACT aerosol solution inhaler, Inhale 2 puffs Daily., Disp: 1 inhaler, Rfl: 11  •  triamcinolone (KENALOG) 0.1 % cream, Apply  topically 2 (Two) Times a Day., Disp: , Rfl:   •  VENTOLIN  (90 Base) MCG/ACT inhaler, 4 (Four) Times a Day As Needed., Disp: , Rfl: 1  •  predniSONE (DELTASONE) 10 MG tablet, Take 4 tabs daily x 3 days, then take 3 tabs daily x 3 days, then take 2 tabs daily x 3 days, then take 1 tab daily  "x 3 days, Disp: 30 tablet, Rfl: 0  MEDICATION LIST AND ALLERGIES REVIEWED.    Social History   Substance Use Topics   • Smoking status: Never Smoker   • Smokeless tobacco: Never Used   • Alcohol use No       FAMILY AND SOCIAL HISTORY REVIEWED.    Review of Systems   Constitutional: Negative for activity change, appetite change, fatigue, fever and unexpected weight change.   HENT: Positive for congestion and rhinorrhea. Negative for postnasal drip, sinus pressure, sore throat and voice change.    Eyes: Negative for visual disturbance.   Respiratory: Positive for cough, chest tightness, shortness of breath and wheezing.    Cardiovascular: Negative for chest pain, palpitations and leg swelling.   Gastrointestinal: Negative for abdominal distention, abdominal pain, nausea and vomiting.   Endocrine: Negative for cold intolerance and heat intolerance.   Genitourinary: Negative for difficulty urinating and urgency.   Musculoskeletal: Negative for arthralgias, back pain and neck pain.   Skin: Negative for color change and pallor.   Allergic/Immunologic: Negative for environmental allergies and food allergies.   Neurological: Negative for dizziness, syncope, weakness and light-headedness.   Hematological: Negative for adenopathy. Does not bruise/bleed easily.   Psychiatric/Behavioral: Negative for agitation and behavioral problems.   .    /100   Pulse 86   Temp 97.2 °F (36.2 °C)   Resp 18   Ht 157.5 cm (62\")   Wt 94.3 kg (208 lb)   SpO2 97% Comment: RA@rest  BMI 38.04 kg/m²       There is no immunization history on file for this patient.    Physical Exam   Constitutional: She is oriented to person, place, and time. She appears well-developed and well-nourished.   HENT:   Head: Normocephalic and atraumatic.   Eyes: Pupils are equal, round, and reactive to light.   Neck: Normal range of motion. Neck supple. No thyromegaly present.   Cardiovascular: Normal rate, regular rhythm, normal heart sounds and intact distal " pulses.  Exam reveals no gallop and no friction rub.    No murmur heard.  Pulmonary/Chest: Effort normal. No respiratory distress. She has wheezes. She has no rales. She exhibits no tenderness.   inspiratory and expiratory wheezes   Abdominal: Soft. Bowel sounds are normal. There is no tenderness.   Musculoskeletal: Normal range of motion.   Lymphadenopathy:     She has no cervical adenopathy.   Neurological: She is alert and oriented to person, place, and time.   Skin: Skin is warm and dry. Capillary refill takes less than 2 seconds. She is not diaphoretic.   Psychiatric: She has a normal mood and affect. Her behavior is normal.   Nursing note and vitals reviewed.    RESULTS    Chest PA/Lateral:  Reviewed by me- appears to have no acute pulmonary process  PROBLEM LIST    Problem List Items Addressed This Visit        Respiratory    SOB (shortness of breath) - Primary    Relevant Medications    predniSONE (DELTASONE) 10 MG tablet    Other Relevant Orders    XR Chest PA & Lateral    STEPHANIE (obstructive sleep apnea)    Relevant Orders    Home Sleep Study       Other    Nonsmoker            DISCUSSION    Mrs. Payne is having an asthma exacerbation.  She discontinued her Symbicort for an unknown reason and she needs to start this back today.  I gave her some samples and advised her to only use her nebulizer's 4 times per day.  She is agreeable.    I also ordered her a Prednisone taper for her exacerbation.  If she doesn't improve by Monday, I want her to call me.  If Symbicort doesn't help she may need Budesonide nebulizer's added.    I'm concerned she may have sleep apnea and will order a home sleep study.  She is agreeable to this.  The sleep center will call her and get this set up.  She may benefit from PAP therapy if she qualifies.      She will call me on Monday and let me know how she is doing, she will follow up with Dr. Crandall in December or sooner if her symptoms worsen.  I spent 15 minutes with the  patient. I spent > 50% percent of this time counseling and discussing diagnosis, prognosis, diagnostic testing, evaluation, current status, treatment options and management.    Adri Little, APRN  10/26/22966:28 PM  Electronically signed     Please note that portions of this note were completed with a voice recognition program. Efforts were made to edit the dictations, but occasionally words are mistranscribed.      CC: Uri Duarte MD

## 2019-02-11 ENCOUNTER — OFFICE VISIT (OUTPATIENT)
Dept: PULMONOLOGY | Facility: CLINIC | Age: 59
End: 2019-02-11

## 2019-02-11 VITALS
DIASTOLIC BLOOD PRESSURE: 120 MMHG | WEIGHT: 210.8 LBS | SYSTOLIC BLOOD PRESSURE: 140 MMHG | HEART RATE: 72 BPM | TEMPERATURE: 97.9 F | HEIGHT: 62 IN | OXYGEN SATURATION: 96 % | BODY MASS INDEX: 38.79 KG/M2

## 2019-02-11 DIAGNOSIS — R05.8 COUGH WITH SPUTUM: ICD-10-CM

## 2019-02-11 DIAGNOSIS — J30.9 CHRONIC ALLERGIC RHINITIS: ICD-10-CM

## 2019-02-11 DIAGNOSIS — J45.20 MILD INTERMITTENT ASTHMA IN ADULT WITHOUT COMPLICATION: Primary | ICD-10-CM

## 2019-02-11 DIAGNOSIS — G47.33 OSA (OBSTRUCTIVE SLEEP APNEA): ICD-10-CM

## 2019-02-11 PROCEDURE — 99214 OFFICE O/P EST MOD 30 MIN: CPT | Performed by: INTERNAL MEDICINE

## 2019-02-11 RX ORDER — INDOMETHACIN 50 MG/1
CAPSULE ORAL
Refills: 6 | COMMUNITY
Start: 2019-01-11

## 2019-02-11 NOTE — PROGRESS NOTES
Ivette Payne is a 58 y.o. female here for evaluation of       Problem list:  1. Asthma  2. Chronic cough  3. Hiatal hernia/GERD  4. Allergic rhinitis  5. Hypothyroid/thyroid surgery  6. Hypertension  7. diverticulosis  8. Cervical stenosis  9. Osteoarthritis spine  10. Hysterectomy  11.  section  12. Nonsmoker, non drinker  13. Allergy to lortab, percocet, hives; bactrim hives, PCN rash    History of Present Illness  58-year-old woman, nonsmoker diagnosed with asthma several years ago. She has had lifelong allergies and a history of hives. She has been allergy tested in the past. Currently her asthma is triggered by cold air, odors such as scented candles, cigarette smoke. She will wheeze sometimes 3 times daily using her rescue inhaler. She nebulizes at least once daily for symptoms. She is currently on Symbicort 162 puffs twice daily, Spiriva 1 puff daily and Singulair as maintenance medications. She is awakening 4 nights out of 7 with wheezing. She does get relief with her rescue inhaler. She took prednisone and antibiotics in December for an exacerbation. She has not required hospitalization or emergency room visits. She recently had a hive reaction to eating nuts. He has no previous history of a nut allergy. She also has a lot of allergic to symptoms with itchy eyes drainage sneezing. The symptoms are worse outdoors. Her wheeze often presents as a dry cough. She has a history of a hiatal hernia but denies waterbrash symptoms or heartburn.    I took care of her son who had some interstitial lung disease and autoimmune hepatitis. He had undergone a liver transplant and developed rejection and ultimately  from his liver failure. She is having a difficult time with his death.    Review of Systems   Constitutional: Negative for activity change.   HENT: Positive for nosebleeds, postnasal drip, sinus pressure and sneezing. Negative for sore throat.    Eyes: Positive for itching.   Respiratory: Positive  for cough and wheezing.    Musculoskeletal: Positive for arthralgias, back pain and myalgias.   Skin: Positive for rash.   Allergic/Immunologic: Positive for environmental allergies.   Neurological: Positive for light-headedness.   Hematological: Bruises/bleeds easily.         Current Outpatient Medications:   •  albuterol (PROVENTIL) (2.5 MG/3ML) 0.083% nebulizer solution, , Disp: , Rfl: 1  •  amitriptyline (ELAVIL) 25 MG tablet, Take 25 mg by mouth At Night As Needed for Sleep., Disp: , Rfl:   •  amLODIPine (NORVASC) 10 MG tablet, 1 tablet Daily., Disp: , Rfl: 0  •  ARMOUR THYROID 300 MG tablet, 1 tablet Daily., Disp: , Rfl: 0  •  aspirin 81 MG tablet, Take 81 mg by mouth Daily., Disp: , Rfl:   •  clotrimazole (LOTRIMIN) 1 % cream, Apply  topically 2 (Two) Times a Day., Disp: , Rfl:   •  fluticasone (FLONASE) 50 MCG/ACT nasal spray, Administer 2 sprays in each nostril DAILY, Disp: 1 bottle, Rfl: 10  •  hydrALAZINE (APRESOLINE) 100 MG tablet, Take 100 mg by mouth 3 (Three) Times a Day., Disp: , Rfl:   •  hydrochlorothiazide (HYDRODIURIL) 25 MG tablet, Take 1 tablet by mouth Daily., Disp: , Rfl:   •  indomethacin (INDOCIN) 50 MG capsule, , Disp: , Rfl: 6  •  ipratropium (ATROVENT) 0.03 % nasal spray, 2 sprays into each nostril Every 12 (Twelve) Hours., Disp: 1 each, Rfl: 12  •  levocetirizine (XYZAL) 5 MG tablet, Take 5 mg by mouth Every Evening., Disp: , Rfl:   •  metoprolol succinate XL (TOPROL-XL) 100 MG 24 hr tablet, Take 150 mg by mouth 2 (Two) Times a Day., Disp: , Rfl:   •  montelukast (SINGULAIR) 10 MG tablet, Take 1 tablet by mouth Every Night., Disp: 30 tablet, Rfl: 11  •  naproxen (NAPROSYN) 500 MG tablet, 2 (Two) Times a Day With Meals., Disp: , Rfl: 1  •  nitroglycerin (NITROSTAT) 0.4 MG SL tablet, Place 0.4 mg under the tongue As Needed for Chest Pain. Take no more than 3 doses in 15 minutes., Disp: , Rfl:   •  raNITIdine (ZANTAC) 150 MG tablet, 2 (Two) Times a Day., Disp: , Rfl: 0  •  spironolactone  "(ALDACTONE) 50 MG tablet, 1 tablet 2 (Two) Times a Day., Disp: , Rfl: 0  •  SYMBICORT 80-4.5 MCG/ACT inhaler, Inhale 2 puffs 2 (Two) Times a Day., Disp: , Rfl: 0  •  Tiotropium Bromide Monohydrate (SPIRIVA RESPIMAT) 1.25 MCG/ACT aerosol solution inhaler, Inhale 2 puffs Daily., Disp: 1 inhaler, Rfl: 11  •  triamcinolone (KENALOG) 0.1 % cream, Apply  topically 2 (Two) Times a Day., Disp: , Rfl:   •  VENTOLIN  (90 Base) MCG/ACT inhaler, 4 (Four) Times a Day As Needed., Disp: , Rfl: 1  •  predniSONE (DELTASONE) 10 MG tablet, Take 4 tabs daily x 3 days, then take 3 tabs daily x 3 days, then take 2 tabs daily x 3 days, then take 1 tab daily x 3 days, Disp: 30 tablet, Rfl: 0    Past Medical History:   Diagnosis Date   • Allergic rhinitis    • Allergy    • Asthma    • Bone disease    • Chronic bronchitis (CMS/HCC)    • GERD (gastroesophageal reflux disease)    • Thyroid disease      Past Surgical History:   Procedure Laterality Date   •  SECTION     • HYSTERECTOMY     • THYROID BIOPSY     • THYROID SURGERY       Social History     Socioeconomic History   • Marital status: Single     Spouse name: Not on file   • Number of children: Not on file   • Years of education: Not on file   • Highest education level: Not on file   Tobacco Use   • Smoking status: Never Smoker   • Smokeless tobacco: Never Used   Substance and Sexual Activity   • Alcohol use: No   • Drug use: No   • Sexual activity: Defer     Family History   Problem Relation Age of Onset   • Liver disease Brother    • Lung disease Brother      Blood pressure (!) 140/120, pulse 72, temperature 97.9 °F (36.6 °C), height 157.5 cm (62\"), weight 95.6 kg (210 lb 12.8 oz), SpO2 96 %.    Physical Exam   Constitutional: She is oriented to person, place, and time. She appears well-developed and well-nourished. No distress.   HENT:   Head: Normocephalic and atraumatic.   Clear PND. No nasal polyps   Eyes: Pupils are equal, round, and reactive to light. No " scleral icterus.   Neck: No thyromegaly present.   Thyroid scar   Cardiovascular: Normal rate, regular rhythm and normal heart sounds.   No murmur heard.  Pulmonary/Chest: Effort normal and breath sounds normal. No respiratory distress. She has no wheezes.   Abdominal: Soft. Bowel sounds are normal. She exhibits no distension. There is no tenderness.   Musculoskeletal: She exhibits no edema.   Lymphadenopathy:     She has no cervical adenopathy.   Neurological: She is alert and oriented to person, place, and time.   Skin: Skin is warm and dry.   Psychiatric: She has a normal mood and affect.         PFTs:  Isha 15, 2018 FVC 2.40 L, 97%, FEV1 1.71 L, 88% ratio 71%    Radiology:    Lab:  January 2018 absolute eosinophil count 210, IgE 198  Ivette was seen today for shortness of breath.    Diagnoses and all orders for this visit:    Mild intermittent asthma in adult without complication    Cough with sputum    Chronic allergic rhinitis    STEPHANIE (obstructive sleep apnea)        Discussion:   58-year-old woman with asthma, allergic rhinitis here for follow-up. She is having frequent daily symptoms of wheezing, dry cough, drainage. She has no active wheezing on examination. She is using her rescue inhaler frequently. In the past she has qualified for biologic agents with an elevated IgE and an absolute eosinophil count greater than 150. However she has a history of hives to different medications and to some foods. I think she needs more extensive allergy testing before I would consider placing her on a biologic agent    Continue Symbicort 160, 2 puffs twice daily  Add Asmanex 200 µg one puff twice daily  Continue Singulair  Continue Spiriva  Rescue inhaler as needed  Referral to an allergist for more extensive testing  Consider initiating a biologic agent follow-up in 3 mos with spirometry    Agnes Crandall MD

## 2019-02-12 DIAGNOSIS — J30.9 CHRONIC ALLERGIC RHINITIS: Primary | ICD-10-CM

## 2019-05-03 ENCOUNTER — OFFICE VISIT (OUTPATIENT)
Dept: PULMONOLOGY | Facility: CLINIC | Age: 59
End: 2019-05-03

## 2019-05-03 VITALS
SYSTOLIC BLOOD PRESSURE: 150 MMHG | HEIGHT: 62 IN | OXYGEN SATURATION: 98 % | WEIGHT: 207.4 LBS | HEART RATE: 76 BPM | BODY MASS INDEX: 38.16 KG/M2 | TEMPERATURE: 97.6 F | DIASTOLIC BLOOD PRESSURE: 96 MMHG

## 2019-05-03 DIAGNOSIS — J45.20 MILD INTERMITTENT ASTHMA IN ADULT WITHOUT COMPLICATION: Primary | ICD-10-CM

## 2019-05-03 DIAGNOSIS — K21.9 GASTROESOPHAGEAL REFLUX DISEASE WITHOUT ESOPHAGITIS: ICD-10-CM

## 2019-05-03 DIAGNOSIS — J30.9 CHRONIC ALLERGIC RHINITIS: ICD-10-CM

## 2019-05-03 PROCEDURE — 94375 RESPIRATORY FLOW VOLUME LOOP: CPT | Performed by: NURSE PRACTITIONER

## 2019-05-03 PROCEDURE — 99213 OFFICE O/P EST LOW 20 MIN: CPT | Performed by: NURSE PRACTITIONER

## 2019-05-03 RX ORDER — NEBIVOLOL HYDROCHLORIDE 20 MG/1
TABLET ORAL
COMMUNITY
Start: 2019-05-01

## 2019-05-03 RX ORDER — BUDESONIDE AND FORMOTEROL FUMARATE DIHYDRATE 160; 4.5 UG/1; UG/1
AEROSOL RESPIRATORY (INHALATION)
Refills: 0 | COMMUNITY
Start: 2019-04-04 | End: 2019-09-17 | Stop reason: ALTCHOICE

## 2019-05-03 NOTE — PROGRESS NOTES
[facility] Pulmonary Follow up    CHIEF COMPLAINT    Asthma/rash    HISTORY OF PRESENT ILLNESS    Ivette Payne is a 58 y.o.female here today for follow-up of her asthma.  She was last seen in our office in February by Dr. Crandall.  She states that she is doing okay from a pulmonary standpoint however she has noticed that she is having to use her rescue inhaler 3 times a day for the last week.  She is also noticed that she has hives breakout on her thighs and arms at nighttime over the last couple of years.  She is states that they seem to be worsening.  She has some pictures that she took of the hives on her thigh that occurred about a month ago.  She states that she had a spot on her arm 2 weeks ago and her PCP saw this.  She states that she has not changed any medications or started any new medications since the skin rashes have occurred.  She states that she had a biopsy performed in a dermatologist office a couple of years ago but does not know the results of this test.  Her PCP had recommended her to follow-up with her dermatologist and is planning on making an appointment for her.    She is also been followed by her allergist and was retested for allergies.  She states that she was tested for outside allergens and food allergies and was told that she is not allergic to any food.  She remains on a Flonase, Xyzal and Singulair daily for her allergies.  She states that smells trigger her breathing more.      She denies fever, chills, sputum production, hemoptysis, night sweats, weight loss, chest pain or palpitations.  She denies any lower extremity edema.  She does have chronic sinus and allergy symptoms.  She denies reflux and is currently on Zantac.    She is taking Symbicort 162 times a day, Spiriva 1.25 mcg daily, Asmanex 200 2 puffs twice a day, and her rescue inhaler as needed for shortness of breath.    Patient Active Problem List   Diagnosis   • SOB (shortness of breath)   • Cough with sputum   •  Asthma in adult   • Nonsmoker   • Chronic allergic rhinitis   • GERD (gastroesophageal reflux disease)       Allergies   Allergen Reactions   • Lortab [Hydrocodone-Acetaminophen] Hives   • Percocet [Oxycodone-Acetaminophen] Hives   • Adhesive Tape Rash   • Bactrim [Sulfamethoxazole-Trimethoprim] Hives and Rash   • Penicillins Rash       Current Outpatient Medications:   •  albuterol (PROVENTIL) (2.5 MG/3ML) 0.083% nebulizer solution, , Disp: , Rfl: 1  •  amitriptyline (ELAVIL) 25 MG tablet, Take 25 mg by mouth At Night As Needed for Sleep., Disp: , Rfl:   •  amLODIPine (NORVASC) 10 MG tablet, 1 tablet Daily., Disp: , Rfl: 0  •  ARMOUR THYROID 300 MG tablet, 1 tablet Daily., Disp: , Rfl: 0  •  aspirin 81 MG tablet, Take 81 mg by mouth Daily., Disp: , Rfl:   •  BYSTOLIC 20 MG tablet, , Disp: , Rfl:   •  clotrimazole (LOTRIMIN) 1 % cream, Apply  topically 2 (Two) Times a Day., Disp: , Rfl:   •  fluticasone (FLONASE) 50 MCG/ACT nasal spray, Administer 2 sprays in each nostril DAILY, Disp: 1 bottle, Rfl: 10  •  hydrALAZINE (APRESOLINE) 100 MG tablet, Take 100 mg by mouth 3 (Three) Times a Day., Disp: , Rfl:   •  hydrochlorothiazide (HYDRODIURIL) 25 MG tablet, Take 1 tablet by mouth Daily., Disp: , Rfl:   •  indomethacin (INDOCIN) 50 MG capsule, , Disp: , Rfl: 6  •  ipratropium (ATROVENT) 0.03 % nasal spray, 2 sprays into each nostril Every 12 (Twelve) Hours., Disp: 1 each, Rfl: 12  •  levocetirizine (XYZAL) 5 MG tablet, Take 5 mg by mouth Every Evening., Disp: , Rfl:   •  metoprolol succinate XL (TOPROL-XL) 100 MG 24 hr tablet, Take 150 mg by mouth 2 (Two) Times a Day., Disp: , Rfl:   •  montelukast (SINGULAIR) 10 MG tablet, Take 1 tablet by mouth Every Night., Disp: 30 tablet, Rfl: 11  •  naproxen (NAPROSYN) 500 MG tablet, 2 (Two) Times a Day With Meals., Disp: , Rfl: 1  •  nitroglycerin (NITROSTAT) 0.4 MG SL tablet, Place 0.4 mg under the tongue As Needed for Chest Pain. Take no more than 3 doses in 15 minutes., Disp:  , Rfl:   •  raNITIdine (ZANTAC) 150 MG tablet, 2 (Two) Times a Day., Disp: , Rfl: 0  •  spironolactone (ALDACTONE) 50 MG tablet, 1 tablet 2 (Two) Times a Day., Disp: , Rfl: 0  •  SYMBICORT 160-4.5 MCG/ACT inhaler, , Disp: , Rfl: 0  •  Tiotropium Bromide Monohydrate (SPIRIVA RESPIMAT) 1.25 MCG/ACT aerosol solution inhaler, Inhale 2 puffs Daily., Disp: 1 inhaler, Rfl: 11  •  triamcinolone (KENALOG) 0.1 % cream, Apply  topically 2 (Two) Times a Day., Disp: , Rfl:   •  VENTOLIN  (90 Base) MCG/ACT inhaler, 4 (Four) Times a Day As Needed., Disp: , Rfl: 1  •  mometasone (ASMANEX TWISTHALER) inhaler 220 mcg/inhalation, Inhale 2 puffs Daily., Disp: 1 inhaler, Rfl: 11  MEDICATION LIST AND ALLERGIES REVIEWED.    Social History     Tobacco Use   • Smoking status: Never Smoker   • Smokeless tobacco: Never Used   Substance Use Topics   • Alcohol use: No   • Drug use: No       FAMILY AND SOCIAL HISTORY REVIEWED.    Review of Systems   Constitutional: Negative for activity change, appetite change, fatigue, fever and unexpected weight change.   HENT: Positive for sneezing. Negative for congestion, postnasal drip, rhinorrhea, sinus pressure, sore throat and voice change.    Eyes: Negative for visual disturbance.   Respiratory: Positive for shortness of breath. Negative for cough, chest tightness and wheezing.    Cardiovascular: Negative for chest pain, palpitations and leg swelling.   Gastrointestinal: Negative for abdominal distention, abdominal pain, nausea and vomiting.   Endocrine: Positive for polyphagia. Negative for cold intolerance and heat intolerance.   Genitourinary: Negative for difficulty urinating and urgency.   Musculoskeletal: Positive for back pain and joint swelling. Negative for arthralgias and neck pain.   Skin: Positive for rash. Negative for color change and pallor.   Allergic/Immunologic: Negative for environmental allergies and food allergies.   Neurological: Positive for dizziness. Negative for  "syncope, weakness and light-headedness.   Hematological: Negative for adenopathy. Does not bruise/bleed easily.   Psychiatric/Behavioral: Negative for agitation and behavioral problems.   .    /96   Pulse 76   Temp 97.6 °F (36.4 °C)   Ht 157.5 cm (62\")   Wt 94.1 kg (207 lb 6.4 oz)   LMP  (LMP Unknown)   SpO2 98% Comment: resting, room air  Breastfeeding? No   BMI 37.93 kg/m²     There is no immunization history for the selected administration types on file for this patient.    Physical Exam   Constitutional: She is oriented to person, place, and time. She appears well-developed and well-nourished.   HENT:   Head: Normocephalic and atraumatic.   Eyes: Pupils are equal, round, and reactive to light.   Neck: Normal range of motion. Neck supple. No thyromegaly present.   Cardiovascular: Normal rate, regular rhythm, normal heart sounds and intact distal pulses. Exam reveals no gallop and no friction rub.   No murmur heard.  Pulmonary/Chest: Effort normal and breath sounds normal. No respiratory distress. She has no wheezes. She has no rales. She exhibits no tenderness.   Abdominal: Soft. Bowel sounds are normal. There is no tenderness.   Musculoskeletal: Normal range of motion.   Lymphadenopathy:     She has no cervical adenopathy.   Neurological: She is alert and oriented to person, place, and time.   Skin: Skin is warm and dry. Capillary refill takes less than 2 seconds. She is not diaphoretic.   Psychiatric: She has a normal mood and affect. Her behavior is normal.   Nursing note and vitals reviewed.        RESULTS    PFTS in the office today, read by me.    Spirometry Interpretation 05/03/19:    1. No airway obstruction as the FEV1 ratio >= 0.7.  2. The maximum voluntary ventilation (MVV) is reduced.    PROBLEM LIST    Problem List Items Addressed This Visit        Respiratory    Asthma in adult - Primary    Relevant Medications    SYMBICORT 160-4.5 MCG/ACT inhaler    mometasone (ASMANEX TWISTHALER) " inhaler 220 mcg/inhalation    Other Relevant Orders    Spirometry Without Bronchodilator (Completed)    Chronic allergic rhinitis       Digestive    GERD (gastroesophageal reflux disease)            DISCUSSION    Ms. Payne was here for follow-up of her asthma.  We reviewed her PFTs in detail today and they are similar to prior exams.  She will remain on Symbicort, Spiriva and Asmanex.  We did not have many samples of Asmanex in the office, however we did have the Asmanex twist inhaler and I provided her with samples of that today in the office, if her insurance will cover this we may switch her to this 1 or which ever one is cheaper for her versus the Asmanex 200 or the Asmanex twist inhaler 220.  I did give her written instructions on how to use the Asmanex twist inhaler today in the office.  She will continue to use her rescue inhaler as needed for shortness of breath.   I am concerned about these rashes that she is having at random times.  I did advise her to follow-up with her dermatologist.  She is going to probably need some biopsies of these hives when they occur.  She states that she will get an appointment with a dermatologist soon.  I would like to find out what the dermatologist thinks before starting her on a biologic agent.  If her dermatologist is not concerned about her rash then we may start her on a biologic agent.    She will continue Flonase, Xyzal and Singulair daily for her allergic rhinitis.  She will continue Zantac for her GERD.  We also discussed reflux precautions in the office today.    She will follow-up in 4 to 6 weeks after she has seen dermatology.  At that time we may start her on a biologic agent for her asthma.  She is agreeable this plan and will call with any questions or concerns.  I spent 15 minutes with the patient. I spent > 50% percent of this time counseling and discussing diagnosis, prognosis, diagnostic testing, evaluation, current status, treatment options and  management.    Adri Little, APRN  05/03/20191:30 PM  Electronically signed     Please note that portions of this note were completed with a voice recognition program. Efforts were made to edit the dictations, but occasionally words are mistranscribed.      CC: Uri Duarte MD

## 2019-05-06 ENCOUNTER — TELEPHONE (OUTPATIENT)
Dept: PULMONOLOGY | Facility: CLINIC | Age: 59
End: 2019-05-06

## 2019-05-06 DIAGNOSIS — J30.9 CHRONIC ALLERGIC RHINITIS: Primary | ICD-10-CM

## 2019-05-06 RX ORDER — FLUTICASONE PROPIONATE 110 UG/1
2 AEROSOL, METERED RESPIRATORY (INHALATION)
Qty: 12 G | Refills: 5 | Status: SHIPPED | OUTPATIENT
Start: 2019-05-06

## 2019-05-14 ENCOUNTER — TELEPHONE (OUTPATIENT)
Dept: PULMONOLOGY | Facility: CLINIC | Age: 59
End: 2019-05-14

## 2019-05-14 NOTE — TELEPHONE ENCOUNTER
[facility]  [id number] pa #[id number]

## 2019-05-31 ENCOUNTER — OFFICE VISIT (OUTPATIENT)
Dept: PULMONOLOGY | Facility: CLINIC | Age: 59
End: 2019-05-31

## 2019-05-31 VITALS
HEIGHT: 62 IN | WEIGHT: 205.6 LBS | BODY MASS INDEX: 37.84 KG/M2 | OXYGEN SATURATION: 98 % | SYSTOLIC BLOOD PRESSURE: 130 MMHG | DIASTOLIC BLOOD PRESSURE: 80 MMHG | HEART RATE: 64 BPM | TEMPERATURE: 97.4 F

## 2019-05-31 DIAGNOSIS — J45.20 MILD INTERMITTENT ASTHMA IN ADULT WITHOUT COMPLICATION: ICD-10-CM

## 2019-05-31 DIAGNOSIS — K21.9 GASTROESOPHAGEAL REFLUX DISEASE WITHOUT ESOPHAGITIS: ICD-10-CM

## 2019-05-31 DIAGNOSIS — J30.9 CHRONIC ALLERGIC RHINITIS: ICD-10-CM

## 2019-05-31 DIAGNOSIS — R06.02 SOB (SHORTNESS OF BREATH): Primary | ICD-10-CM

## 2019-05-31 PROCEDURE — 99213 OFFICE O/P EST LOW 20 MIN: CPT | Performed by: NURSE PRACTITIONER

## 2019-05-31 NOTE — PROGRESS NOTES
[facility] Pulmonary Follow up    CHIEF COMPLAINT    Asthma    HISTORY OF PRESENT ILLNESS    Ivette Payne is a 58 y.o.female here today for follow-up of asthma.  She was last seen in our office about a month ago by me.  Since that time she has seen her dermatologist for her hives.  She was told that it was felt that this could be an autoimmune disorder or possible allergic reaction.  She states that her hives occur at random times and nothing helps the symptoms when they occur.  She has tried Benadryl and hydrocortisone cream but nothing helps the hives.  She states that the hives will go away in 1 to 3 days.  She states that she will wake up in the middle the night with itching in between her toes and random hives on her legs trunk area and arms.    She states that her breathing is doing somewhat better.  She remains on Symbicort, Asmanex and Spiriva.  She uses her rescue inhaler as needed for shortness of breath or wheezing.  She states that she used her rescue inhaler 2 times per day last week.  She has not been using her rescue inhaler the last couple of days.  She states she will have occasional wheezing but it comes and goes.      She also has noticed an increase in her rhinorrhea and postnasal drainage.  She continues to take Singulair, Flonase, and Xyzal for her allergic rhinitis.    She denies fever, chills, sputum production, hemoptysis, night sweats, weight loss, chest pain or palpitations.  She denies any lower extremity edema.  She is currently on Zantac daily and denies reflux symptoms.    She has some pictures of her hives for me to see today.  Patient Active Problem List   Diagnosis   • SOB (shortness of breath)   • Cough with sputum   • Asthma in adult   • Nonsmoker   • Chronic allergic rhinitis   • GERD (gastroesophageal reflux disease)       Allergies   Allergen Reactions   • Lortab [Hydrocodone-Acetaminophen] Hives   • Percocet [Oxycodone-Acetaminophen] Hives   • Adhesive Tape Rash   • Bactrim  [Sulfamethoxazole-Trimethoprim] Hives and Rash   • Penicillins Rash       Current Outpatient Medications:   •  albuterol (PROVENTIL) (2.5 MG/3ML) 0.083% nebulizer solution, , Disp: , Rfl: 1  •  amitriptyline (ELAVIL) 25 MG tablet, Take 25 mg by mouth At Night As Needed for Sleep., Disp: , Rfl:   •  amLODIPine (NORVASC) 10 MG tablet, 1 tablet Daily., Disp: , Rfl: 0  •  ARMOUR THYROID 300 MG tablet, 1 tablet Daily., Disp: , Rfl: 0  •  aspirin 81 MG tablet, Take 81 mg by mouth Daily., Disp: , Rfl:   •  BYSTOLIC 20 MG tablet, , Disp: , Rfl:   •  clotrimazole (LOTRIMIN) 1 % cream, Apply  topically 2 (Two) Times a Day., Disp: , Rfl:   •  fluticasone (FLONASE) 50 MCG/ACT nasal spray, Administer 2 sprays in each nostril DAILY, Disp: 1 bottle, Rfl: 10  •  fluticasone (FLOVENT HFA) 110 MCG/ACT inhaler, Inhale 2 puffs 2 (Two) Times a Day., Disp: 12 g, Rfl: 5  •  hydrALAZINE (APRESOLINE) 100 MG tablet, Take 100 mg by mouth 3 (Three) Times a Day., Disp: , Rfl:   •  hydrochlorothiazide (HYDRODIURIL) 25 MG tablet, Take 1 tablet by mouth Daily., Disp: , Rfl:   •  indomethacin (INDOCIN) 50 MG capsule, , Disp: , Rfl: 6  •  ipratropium (ATROVENT) 0.03 % nasal spray, 2 sprays into each nostril Every 12 (Twelve) Hours., Disp: 1 each, Rfl: 12  •  levocetirizine (XYZAL) 5 MG tablet, Take 5 mg by mouth Every Evening., Disp: , Rfl:   •  metoprolol succinate XL (TOPROL-XL) 100 MG 24 hr tablet, Take 150 mg by mouth 2 (Two) Times a Day., Disp: , Rfl:   •  montelukast (SINGULAIR) 10 MG tablet, Take 1 tablet by mouth Every Night., Disp: 30 tablet, Rfl: 11  •  naproxen (NAPROSYN) 500 MG tablet, 2 (Two) Times a Day With Meals., Disp: , Rfl: 1  •  nitroglycerin (NITROSTAT) 0.4 MG SL tablet, Place 0.4 mg under the tongue As Needed for Chest Pain. Take no more than 3 doses in 15 minutes., Disp: , Rfl:   •  raNITIdine (ZANTAC) 150 MG tablet, 2 (Two) Times a Day., Disp: , Rfl: 0  •  spironolactone (ALDACTONE) 50 MG tablet, 1 tablet 2 (Two) Times a  Day., Disp: , Rfl: 0  •  SYMBICORT 160-4.5 MCG/ACT inhaler, , Disp: , Rfl: 0  •  Tiotropium Bromide Monohydrate (SPIRIVA RESPIMAT) 1.25 MCG/ACT aerosol solution inhaler, Inhale 2 puffs Daily., Disp: 1 inhaler, Rfl: 11  •  triamcinolone (KENALOG) 0.1 % cream, Apply  topically 2 (Two) Times a Day., Disp: , Rfl:   •  VENTOLIN  (90 Base) MCG/ACT inhaler, 4 (Four) Times a Day As Needed., Disp: , Rfl: 1  MEDICATION LIST AND ALLERGIES REVIEWED.    Social History     Tobacco Use   • Smoking status: Never Smoker   • Smokeless tobacco: Never Used   Substance Use Topics   • Alcohol use: No   • Drug use: No       FAMILY AND SOCIAL HISTORY REVIEWED.    Review of Systems   Constitutional: Negative for activity change, appetite change, fatigue, fever and unexpected weight change.   HENT: Positive for rhinorrhea, sneezing and voice change. Negative for congestion, postnasal drip, sinus pressure and sore throat.    Eyes: Negative for visual disturbance.   Respiratory: Positive for cough and shortness of breath. Negative for chest tightness and wheezing.    Cardiovascular: Positive for leg swelling. Negative for chest pain and palpitations.   Gastrointestinal: Negative for abdominal distention, abdominal pain, nausea and vomiting.   Endocrine: Positive for polydipsia. Negative for cold intolerance and heat intolerance.   Genitourinary: Negative for difficulty urinating and urgency.   Musculoskeletal: Positive for back pain and myalgias. Negative for arthralgias and neck pain.   Skin: Positive for rash. Negative for color change and pallor.   Allergic/Immunologic: Negative for environmental allergies and food allergies.   Neurological: Negative for dizziness, syncope, weakness and light-headedness.   Hematological: Negative for adenopathy. Does not bruise/bleed easily.   Psychiatric/Behavioral: Negative for agitation and behavioral problems.   .    /80 (BP Location: Right arm, Patient Position: Sitting)   Pulse 64    "Temp 97.4 °F (36.3 °C)   Ht 157.5 cm (62\")   Wt 93.3 kg (205 lb 9.6 oz)   LMP  (LMP Unknown)   SpO2 98% Comment: resting at room air  BMI 37.60 kg/m²     There is no immunization history for the selected administration types on file for this patient.    Physical Exam   Constitutional: She is oriented to person, place, and time. She appears well-developed and well-nourished.   HENT:   Head: Normocephalic and atraumatic.   Eyes: Pupils are equal, round, and reactive to light.   Neck: Normal range of motion. Neck supple. No thyromegaly present.   Cardiovascular: Normal rate, regular rhythm, normal heart sounds and intact distal pulses. Exam reveals no gallop and no friction rub.   No murmur heard.  Pulmonary/Chest: Effort normal and breath sounds normal. No respiratory distress. She has no wheezes. She has no rales. She exhibits no tenderness.   Abdominal: Soft. Bowel sounds are normal. There is no tenderness.   Musculoskeletal: Normal range of motion.   Lymphadenopathy:     She has no cervical adenopathy.   Neurological: She is alert and oriented to person, place, and time.   Skin: Skin is warm and dry. Capillary refill takes less than 2 seconds. She is not diaphoretic.   Psychiatric: She has a normal mood and affect. Her behavior is normal.   Nursing note and vitals reviewed.        RESULTS      PROBLEM LIST    Problem List Items Addressed This Visit        Respiratory    SOB (shortness of breath) - Primary    Asthma in adult    Chronic allergic rhinitis       Digestive    GERD (gastroesophageal reflux disease)            DISCUSSION    Ms. Payne was here for follow-up of her asthma.  She seems to be doing pretty well with her asthma currently.  She will remain on Symbicort, Spiriva and Asmanex.  She will continue to use her rescue inhaler as needed for shortness of breath or wheezing.  I do think that she qualifies for a biologic agent.  She states that she had some lab work drawn at her PCP a month ago, I " will obtain these lab records and if she needs any additional testings I will order them and call her let her know.  I will try starting her with nucala or Xolair.  We will call her so that she can stop in the office and sign paperwork for starting these medications.  She states she will come by sometime next week if needed.    She also states that her dermatologist is going to draw some extra labs for autoimmune disorders.  I do think that this is a good idea as well.  I will see what her PCP has drawn and if she needs any additional lab testing I will call her and see if her dermatologist will order the lab work.  If not she can come by the office and get some extra lab work completed.    She will continue Singulair, Flonase and Xyzal for her allergic rhinitis.    She will continue Zantac for her GERD.    She will follow-up in 3 months or sooner if her symptoms worsen.  I spent 15 minutes with the patient. I spent > 50% percent of this time counseling and discussing diagnosis, prognosis, diagnostic testing, evaluation, current status, treatment options and management.    Adri Little, APRN  05/31/201910:03 AM  Electronically signed     Please note that portions of this note were completed with a voice recognition program. Efforts were made to edit the dictations, but occasionally words are mistranscribed.      CC: Uri Duarte MD

## 2019-09-17 ENCOUNTER — OFFICE VISIT (OUTPATIENT)
Dept: PULMONOLOGY | Facility: CLINIC | Age: 59
End: 2019-09-17

## 2019-09-17 VITALS
BODY MASS INDEX: 36.25 KG/M2 | WEIGHT: 197 LBS | HEART RATE: 68 BPM | TEMPERATURE: 98.2 F | HEIGHT: 62 IN | RESPIRATION RATE: 16 BRPM | SYSTOLIC BLOOD PRESSURE: 186 MMHG | OXYGEN SATURATION: 100 % | DIASTOLIC BLOOD PRESSURE: 110 MMHG

## 2019-09-17 DIAGNOSIS — J45.20 MILD INTERMITTENT ASTHMA IN ADULT WITHOUT COMPLICATION: Primary | ICD-10-CM

## 2019-09-17 DIAGNOSIS — J30.9 CHRONIC ALLERGIC RHINITIS: ICD-10-CM

## 2019-09-17 DIAGNOSIS — J45.30 MILD PERSISTENT ASTHMA IN ADULT WITHOUT COMPLICATION: ICD-10-CM

## 2019-09-17 PROCEDURE — 99213 OFFICE O/P EST LOW 20 MIN: CPT | Performed by: INTERNAL MEDICINE

## 2019-09-17 PROCEDURE — 94060 EVALUATION OF WHEEZING: CPT | Performed by: INTERNAL MEDICINE

## 2019-09-17 RX ORDER — NADOLOL 40 MG/1
TABLET ORAL DAILY
Refills: 6 | COMMUNITY
Start: 2019-06-19 | End: 2019-09-17 | Stop reason: ALTCHOICE

## 2019-09-17 RX ORDER — ALBUTEROL SULFATE 90 UG/1
4 AEROSOL, METERED RESPIRATORY (INHALATION) ONCE
Status: COMPLETED | OUTPATIENT
Start: 2019-09-17 | End: 2019-09-17

## 2019-09-17 RX ADMIN — ALBUTEROL SULFATE 4 PUFF: 90 AEROSOL, METERED RESPIRATORY (INHALATION) at 08:31

## 2019-09-17 NOTE — PROGRESS NOTES
Ivette Payne is a 59 y.o. female here for evaluation of   Chief Complaint   Patient presents with   • Wheezing   • Asthma       Problem list:  1. Asthma  2. Chronic cough  3. Hiatal hernia/GERD  4. Allergic rhinitis  5. Hives  6. Depression  7. Hypothyroid/thyroid surgery  8. Hypertension  9. diverticulosis  10. Cervical stenosis  11. Osteoarthritis spine  12. Hysterectomy  13.  section  14. Nonsmoker, non drinker  15. Allergy to lortab, percocet, hives; bactrim hives, PCN rash    History of Present Illness    59-year-old woman, non-smoker here for follow-up of asthma.  He is also had some episodes of hives requiring hydrocortisone cream and Benadryl.  She woke up with some small raised red bumps on her left arm.  Approximately 3 weeks ago she required steroids for an allergic reaction.  Currently her asthma regimen includes Asmanex twice daily and on as needed rescue inhaler, Singulair 10 mg daily, Spiriva 1 puff daily.  She has had increased use of her rescue inhaler or nebulizer.  She is having nocturnal wheezing 4-5 nights a week and daytime symptoms with pollen exposure.  She has a known history of reflux with good symptom control on ranitidine.  She has not been to the emergency room or been hospitalized for asthma.  She could not undergo the skin testing from the allergist but did have a blood test.  Her blood pressure was significantly elevated today.  She has taken her morning medications.  She does not have headache or blurred vision.  She denies chest pain.    Review of Systems   HENT: Positive for sinus pressure and sneezing.    Respiratory: Positive for wheezing.    Cardiovascular: Positive for palpitations.   Musculoskeletal: Positive for arthralgias and myalgias.   Skin: Positive for rash.   Allergic/Immunologic: Positive for immunocompromised state.   Psychiatric/Behavioral: Positive for dysphoric mood.         Current Outpatient Medications:   •  albuterol (PROVENTIL) (2.5 MG/3ML) 0.083%  nebulizer solution, , Disp: , Rfl: 1  •  amitriptyline (ELAVIL) 25 MG tablet, Take 25 mg by mouth At Night As Needed for Sleep., Disp: , Rfl:   •  amLODIPine (NORVASC) 10 MG tablet, 1 tablet Daily., Disp: , Rfl: 0  •  ARMOUR THYROID 300 MG tablet, 1 tablet Daily., Disp: , Rfl: 0  •  aspirin 81 MG tablet, Take 81 mg by mouth Daily., Disp: , Rfl:   •  BYSTOLIC 20 MG tablet, , Disp: , Rfl:   •  clotrimazole (LOTRIMIN) 1 % cream, Apply  topically 2 (Two) Times a Day., Disp: , Rfl:   •  fluticasone (FLONASE) 50 MCG/ACT nasal spray, Administer 2 sprays in each nostril DAILY, Disp: 1 bottle, Rfl: 10  •  fluticasone (FLOVENT HFA) 110 MCG/ACT inhaler, Inhale 2 puffs 2 (Two) Times a Day., Disp: 12 g, Rfl: 5  •  hydrALAZINE (APRESOLINE) 100 MG tablet, Take 100 mg by mouth 3 (Three) Times a Day., Disp: , Rfl:   •  hydrochlorothiazide (HYDRODIURIL) 25 MG tablet, Take 1 tablet by mouth Daily., Disp: , Rfl:   •  indomethacin (INDOCIN) 50 MG capsule, , Disp: , Rfl: 6  •  ipratropium (ATROVENT) 0.03 % nasal spray, 2 sprays into each nostril Every 12 (Twelve) Hours., Disp: 1 each, Rfl: 12  •  levocetirizine (XYZAL) 5 MG tablet, Take 5 mg by mouth Every Evening., Disp: , Rfl:   •  montelukast (SINGULAIR) 10 MG tablet, Take 1 tablet by mouth Every Night., Disp: 30 tablet, Rfl: 11  •  naproxen (NAPROSYN) 500 MG tablet, 2 (Two) Times a Day With Meals., Disp: , Rfl: 1  •  nitroglycerin (NITROSTAT) 0.4 MG SL tablet, Place 0.4 mg under the tongue As Needed for Chest Pain. Take no more than 3 doses in 15 minutes., Disp: , Rfl:   •  raNITIdine (ZANTAC) 150 MG tablet, 2 (Two) Times a Day., Disp: , Rfl: 0  •  spironolactone (ALDACTONE) 50 MG tablet, 1 tablet 2 (Two) Times a Day., Disp: , Rfl: 0  •  SYMBICORT 160-4.5 MCG/ACT inhaler, , Disp: , Rfl: 0  •  Tiotropium Bromide Monohydrate (SPIRIVA RESPIMAT) 1.25 MCG/ACT aerosol solution inhaler, Inhale 2 puffs Daily., Disp: 1 inhaler, Rfl: 11  •  triamcinolone (KENALOG) 0.1 % cream, Apply  topically  "2 (Two) Times a Day., Disp: , Rfl:   •  VENTOLIN  (90 Base) MCG/ACT inhaler, 4 (Four) Times a Day As Needed., Disp: , Rfl: 1  •  ASMANEX, 120 METERED DOSES, 220 MCG/INH inhaler, INHALE 2 PUFFS PO QD, Disp: , Rfl: 6    Current Facility-Administered Medications:   •  albuterol sulfate HFA (PROVENTIL HFA;VENTOLIN HFA;PROAIR HFA) inhaler 4 puff, 4 puff, Inhalation, Once, Agnes Crandall MD    Past Medical History:   Diagnosis Date   • Allergic rhinitis    • Allergy    • Asthma    • Bone disease    • Chronic bronchitis (CMS/HCC)    • GERD (gastroesophageal reflux disease)    • Thyroid disease      Past Surgical History:   Procedure Laterality Date   •  SECTION     • HYSTERECTOMY     • THYROID BIOPSY     • THYROID SURGERY       Social History     Socioeconomic History   • Marital status: Single     Spouse name: Not on file   • Number of children: Not on file   • Years of education: Not on file   • Highest education level: Not on file   Tobacco Use   • Smoking status: Never Smoker   • Smokeless tobacco: Never Used   Substance and Sexual Activity   • Alcohol use: No   • Drug use: No   • Sexual activity: Defer     Family History   Problem Relation Age of Onset   • Liver disease Brother    • Lung disease Brother      Blood pressure (!) 186/110, pulse 68, temperature 98.2 °F (36.8 °C), resp. rate 16, height 157.5 cm (62\"), weight 89.4 kg (197 lb), SpO2 100 %    Physical Exam   Constitutional: She is oriented to person, place, and time. She appears well-developed and well-nourished. No distress.   HENT:   Head: Normocephalic and atraumatic.   ENLARGED TONSILS WITHOUT PURULENCE, CLEAR PND. Nasal mucosa erythema   Eyes: EOM are normal. Pupils are equal, round, and reactive to light. No scleral icterus.   Neck: No JVD present. No tracheal deviation present. No thyromegaly present.   Cardiovascular: Normal rate, regular rhythm and normal heart sounds.   No murmur heard.  Pulmonary/Chest: Effort normal " and breath sounds normal. She has no wheezes.   Abdominal: Soft. Bowel sounds are normal. She exhibits no distension. There is no tenderness.   Musculoskeletal: She exhibits no edema.   Lymphadenopathy:     She has no cervical adenopathy.   Neurological: She is oriented to person, place, and time.   Skin: Skin is warm and dry. No erythema.   Psychiatric: She has a normal mood and affect.         PFTs:  FVC 2.42 L, 100%, FEV1 1.64 L, 87% ratio 68% consistent with mild obstruction.  After bronchodilator FEV1 improved to 1.80 L, 95% which is a 9% improvement.  Radiology:    Lab:    Ivette was seen today for wheezing and asthma.    Diagnoses and all orders for this visit:    Mild persistent asthma in adult without complication           albuterol sulfate HFA (PROVENTIL HFA;VENTOLIN HFA;PROAIR HFA) inhaler 4 puff  -     Spirometry With Bronchodilator  Chronic allergic rhinitis        Discussion:   59-year-old woman, non-smoker initially with intermittent asthma but now with increased symptoms her score is more mild to moderate persistent asthma with nocturnal symptoms and weekly symptoms.  I think she would benefit from inhaled corticosteroid with long-acting bronchodilator regimen.  She was supposed to be taking Symbicort with extra inhaled corticosteroid on top of it.  However it seems that she is only taking the Asmanex and not the Symbicort.  I have also considered evaluating her for biologic agent.  In January 2018 her IgE was 198.  She is only received prednisone the one time for hives last month.  And her absolute eosinophil count was 210.  In March of this year her absolute eosinophil count was 210.  Her asthma symptoms do appear related to her allergic rhinitis.  Her blood pressure was markedly elevated today, 186/110.  She notes that she is stressed and depressed about her son's death 3 years ago.  He was a patient of mine with autoimmune hepatitis and lung disease undergoing liver transplant.  In May of this  year her blood pressure was well controlled at 130/80.  She is currently on amlodipine, by systolic, hydrochlorothiazide, spironolactone, hydralazine.     Start generic Advair 115, 2 puffs twice daily  Continue Spiriva  Stop Asmanex, Symbicort  Continue Atrovent nasal spray as needed for drainage  Flonase 2 sprays each nostril daily  Continue Xyzal  Continue Singulair  Albuterol rescue inhaler  I asked her to call Dr. Duarte for suggestions about her blood pressure management      Agnes Crandall MD

## 2019-09-19 ENCOUNTER — TELEPHONE (OUTPATIENT)
Dept: PULMONOLOGY | Facility: CLINIC | Age: 59
End: 2019-09-19

## 2019-09-19 DIAGNOSIS — J45.30 MILD PERSISTENT ASTHMA IN ADULT WITHOUT COMPLICATION: Primary | ICD-10-CM

## 2024-08-28 ENCOUNTER — APPOINTMENT (OUTPATIENT)
Facility: HOSPITAL | Age: 64
End: 2024-08-28
Payer: MEDICARE

## 2024-08-28 ENCOUNTER — HOSPITAL ENCOUNTER (EMERGENCY)
Facility: HOSPITAL | Age: 64
Discharge: HOME | End: 2024-08-28
Attending: STUDENT IN AN ORGANIZED HEALTH CARE EDUCATION/TRAINING PROGRAM
Payer: MEDICARE

## 2024-08-28 VITALS
TEMPERATURE: 97.6 F | OXYGEN SATURATION: 97 % | HEIGHT: 61 IN | SYSTOLIC BLOOD PRESSURE: 125 MMHG | WEIGHT: 197 LBS | HEART RATE: 63 BPM | BODY MASS INDEX: 37.19 KG/M2 | DIASTOLIC BLOOD PRESSURE: 80 MMHG | RESPIRATION RATE: 26 BRPM

## 2024-08-28 DIAGNOSIS — J45.901 MILD ASTHMA WITH EXACERBATION, UNSPECIFIED WHETHER PERSISTENT: Primary | ICD-10-CM

## 2024-08-28 LAB
ALBUMIN SERPL-MCNC: 4.3 G/DL (ref 3.5–5.2)
ALBUMIN/GLOB SERPL: 1.2 G/DL
ALP SERPL-CCNC: 81 U/L (ref 39–117)
ALT SERPL W P-5'-P-CCNC: <5 U/L (ref 1–33)
ANION GAP SERPL CALCULATED.3IONS-SCNC: 10.3 MMOL/L (ref 5–15)
AST SERPL-CCNC: 19 U/L (ref 1–32)
B PARAPERT DNA SPEC QL NAA+PROBE: NOT DETECTED
B PERT DNA SPEC QL NAA+PROBE: NOT DETECTED
BASOPHILS # BLD AUTO: 0.03 10*3/MM3 (ref 0–0.2)
BASOPHILS NFR BLD AUTO: 0.5 % (ref 0–1.5)
BILIRUB SERPL-MCNC: 0.7 MG/DL (ref 0–1.2)
BUN SERPL-MCNC: 20 MG/DL (ref 8–23)
BUN/CREAT SERPL: 23.8 (ref 7–25)
C PNEUM DNA NPH QL NAA+NON-PROBE: NOT DETECTED
CALCIUM SPEC-SCNC: 9.7 MG/DL (ref 8.6–10.5)
CHLORIDE SERPL-SCNC: 104 MMOL/L (ref 98–107)
CO2 SERPL-SCNC: 25.7 MMOL/L (ref 22–29)
CREAT SERPL-MCNC: 0.84 MG/DL (ref 0.57–1)
DEPRECATED RDW RBC AUTO: 49 FL (ref 37–54)
EGFRCR SERPLBLD CKD-EPI 2021: 77.2 ML/MIN/1.73
EOSINOPHIL # BLD AUTO: 0.27 10*3/MM3 (ref 0–0.4)
EOSINOPHIL NFR BLD AUTO: 4.2 % (ref 0.3–6.2)
ERYTHROCYTE [DISTWIDTH] IN BLOOD BY AUTOMATED COUNT: 21.8 % (ref 12.3–15.4)
FLUAV SUBTYP SPEC NAA+PROBE: NOT DETECTED
FLUBV RNA ISLT QL NAA+PROBE: NOT DETECTED
GLOBULIN UR ELPH-MCNC: 3.6 GM/DL
GLUCOSE SERPL-MCNC: 94 MG/DL (ref 65–99)
HADV DNA SPEC NAA+PROBE: NOT DETECTED
HCOV 229E RNA SPEC QL NAA+PROBE: NOT DETECTED
HCOV HKU1 RNA SPEC QL NAA+PROBE: NOT DETECTED
HCOV NL63 RNA SPEC QL NAA+PROBE: NOT DETECTED
HCOV OC43 RNA SPEC QL NAA+PROBE: NOT DETECTED
HCT VFR BLD AUTO: 38.2 % (ref 34–46.6)
HGB BLD-MCNC: 12.1 G/DL (ref 12–15.9)
HMPV RNA NPH QL NAA+NON-PROBE: NOT DETECTED
HOLD SPECIMEN: NORMAL
HPIV1 RNA ISLT QL NAA+PROBE: NOT DETECTED
HPIV2 RNA SPEC QL NAA+PROBE: NOT DETECTED
HPIV3 RNA NPH QL NAA+PROBE: NOT DETECTED
HPIV4 P GENE NPH QL NAA+PROBE: NOT DETECTED
IMM GRANULOCYTES # BLD AUTO: 0 10*3/MM3 (ref 0–0.05)
IMM GRANULOCYTES NFR BLD AUTO: 0 % (ref 0–0.5)
LYMPHOCYTES # BLD AUTO: 1.66 10*3/MM3 (ref 0.7–3.1)
LYMPHOCYTES NFR BLD AUTO: 25.5 % (ref 19.6–45.3)
M PNEUMO IGG SER IA-ACNC: NOT DETECTED
MCH RBC QN AUTO: 20.8 PG (ref 26.6–33)
MCHC RBC AUTO-ENTMCNC: 31.7 G/DL (ref 31.5–35.7)
MCV RBC AUTO: 65.6 FL (ref 79–97)
MICROCYTES BLD QL: NORMAL
MONOCYTES # BLD AUTO: 0.34 10*3/MM3 (ref 0.1–0.9)
MONOCYTES NFR BLD AUTO: 5.2 % (ref 5–12)
NEUTROPHILS NFR BLD AUTO: 4.2 10*3/MM3 (ref 1.7–7)
NEUTROPHILS NFR BLD AUTO: 64.6 % (ref 42.7–76)
NT-PROBNP SERPL-MCNC: 122 PG/ML (ref 0–900)
PLAT MORPH BLD: NORMAL
PLATELET # BLD AUTO: 274 10*3/MM3 (ref 140–450)
POTASSIUM SERPL-SCNC: 4.2 MMOL/L (ref 3.5–5.2)
PROT SERPL-MCNC: 7.9 G/DL (ref 6–8.5)
QT INTERVAL: 394 MS
QTC INTERVAL: 418 MS
RBC # BLD AUTO: 5.82 10*6/MM3 (ref 3.77–5.28)
RHINOVIRUS RNA SPEC NAA+PROBE: NOT DETECTED
RSV RNA NPH QL NAA+NON-PROBE: NOT DETECTED
SARS-COV-2 RNA RESP QL NAA+PROBE: NOT DETECTED
SODIUM SERPL-SCNC: 140 MMOL/L (ref 136–145)
TROPONIN T SERPL HS-MCNC: 10 NG/L
WBC MORPH BLD: NORMAL
WBC NRBC COR # BLD AUTO: 6.5 10*3/MM3 (ref 3.4–10.8)
WHOLE BLOOD HOLD COAG: NORMAL
WHOLE BLOOD HOLD SPECIMEN: NORMAL

## 2024-08-28 PROCEDURE — 85025 COMPLETE CBC W/AUTO DIFF WBC: CPT | Performed by: STUDENT IN AN ORGANIZED HEALTH CARE EDUCATION/TRAINING PROGRAM

## 2024-08-28 PROCEDURE — 94640 AIRWAY INHALATION TREATMENT: CPT

## 2024-08-28 PROCEDURE — 84484 ASSAY OF TROPONIN QUANT: CPT | Performed by: STUDENT IN AN ORGANIZED HEALTH CARE EDUCATION/TRAINING PROGRAM

## 2024-08-28 PROCEDURE — 85007 BL SMEAR W/DIFF WBC COUNT: CPT | Performed by: STUDENT IN AN ORGANIZED HEALTH CARE EDUCATION/TRAINING PROGRAM

## 2024-08-28 PROCEDURE — 93005 ELECTROCARDIOGRAM TRACING: CPT | Performed by: STUDENT IN AN ORGANIZED HEALTH CARE EDUCATION/TRAINING PROGRAM

## 2024-08-28 PROCEDURE — 71045 X-RAY EXAM CHEST 1 VIEW: CPT

## 2024-08-28 PROCEDURE — 96374 THER/PROPH/DIAG INJ IV PUSH: CPT

## 2024-08-28 PROCEDURE — 83880 ASSAY OF NATRIURETIC PEPTIDE: CPT | Performed by: STUDENT IN AN ORGANIZED HEALTH CARE EDUCATION/TRAINING PROGRAM

## 2024-08-28 PROCEDURE — 80053 COMPREHEN METABOLIC PANEL: CPT | Performed by: STUDENT IN AN ORGANIZED HEALTH CARE EDUCATION/TRAINING PROGRAM

## 2024-08-28 PROCEDURE — 0202U NFCT DS 22 TRGT SARS-COV-2: CPT | Performed by: PHYSICIAN ASSISTANT

## 2024-08-28 PROCEDURE — 99284 EMERGENCY DEPT VISIT MOD MDM: CPT

## 2024-08-28 PROCEDURE — 25010000002 METHYLPREDNISOLONE PER 125 MG: Performed by: PHYSICIAN ASSISTANT

## 2024-08-28 RX ORDER — AZITHROMYCIN 250 MG/1
TABLET, FILM COATED ORAL
Qty: 6 TABLET | Refills: 0 | Status: SHIPPED | OUTPATIENT
Start: 2024-08-28

## 2024-08-28 RX ORDER — IPRATROPIUM BROMIDE AND ALBUTEROL SULFATE 2.5; .5 MG/3ML; MG/3ML
3 SOLUTION RESPIRATORY (INHALATION) ONCE
Status: COMPLETED | OUTPATIENT
Start: 2024-08-28 | End: 2024-08-28

## 2024-08-28 RX ORDER — SODIUM CHLORIDE 0.9 % (FLUSH) 0.9 %
10 SYRINGE (ML) INJECTION AS NEEDED
Status: DISCONTINUED | OUTPATIENT
Start: 2024-08-28 | End: 2024-08-28 | Stop reason: HOSPADM

## 2024-08-28 RX ORDER — METHYLPREDNISOLONE 4 MG
TABLET, DOSE PACK ORAL
Qty: 21 TABLET | Refills: 0 | Status: SHIPPED | OUTPATIENT
Start: 2024-08-28

## 2024-08-28 RX ORDER — METHYLPREDNISOLONE SODIUM SUCCINATE 125 MG/2ML
125 INJECTION, POWDER, LYOPHILIZED, FOR SOLUTION INTRAMUSCULAR; INTRAVENOUS ONCE
Status: COMPLETED | OUTPATIENT
Start: 2024-08-28 | End: 2024-08-28

## 2024-08-28 RX ADMIN — IPRATROPIUM BROMIDE AND ALBUTEROL SULFATE 3 ML: 2.5; .5 SOLUTION RESPIRATORY (INHALATION) at 13:49

## 2024-08-28 RX ADMIN — METHYLPREDNISOLONE SODIUM SUCCINATE 125 MG: 125 INJECTION, POWDER, FOR SOLUTION INTRAMUSCULAR; INTRAVENOUS at 14:07

## 2024-08-28 NOTE — FSED PROVIDER NOTE
[place]    EMERGENCY DEPARTMENT ENCOUNTER      Pt Name: Ivette Payne  MRN: [id number]  YOB: 1960  Date of evaluation: 2024  Provider: Linnea Martínez PA-C    CHIEF COMPLAINT       Chief Complaint   Patient presents with    Asthma     HISTORY OF PRESENT ILLNESS  (Location/Symptom, Timing/Onset, Context/Setting, Quality, Duration, Modifying Factors, Severity.)   Ivette Payne is a 64 y.o. female who presents to the emergency department with 1.5-week history of a productive cough with clear sputum and wheezing.  Patient does have a history of asthma.  She states she has done a neb at home as well and as inhalers without improvement of symptoms.  Patient denies recently being on antibiotics or steroids.    Nursing notes were reviewed.  REVIEW OF SYSTEMS    (2-9 systems for level 4, 10 or more for level 5)   Review of Systems   Constitutional:  Negative for chills and fever.   HENT:  Negative for ear pain and sore throat.    Respiratory:  Positive for cough and wheezing.    Cardiovascular:  Negative for chest pain.   Gastrointestinal:  Negative for diarrhea, nausea and vomiting.   Genitourinary:  Negative for dysuria and frequency.   Musculoskeletal:  Negative for back pain and neck pain.   Neurological:  Negative for headaches.      All systems reviewed and negative except for those discussed in HPI.   PAST MEDICAL HISTORY     Past Medical History:   Diagnosis Date    Allergic rhinitis     Allergy     Asthma     Bone disease     Chronic bronchitis     GERD (gastroesophageal reflux disease)     Thyroid disease      SURGICAL HISTORY       Past Surgical History:   Procedure Laterality Date     SECTION      HYSTERECTOMY      THYROID BIOPSY      THYROID SURGERY       CURRENT MEDICATIONS       Current Facility-Administered Medications:     sodium chloride 0.9 % flush 10 mL, 10 mL, Intravenous, PRN, Hammad Walsh MD    [COMPLETED] Insert Peripheral IV, , , Once **AND**  sodium chloride 0.9 % flush 10 mL, 10 mL, Intravenous, PRN, Linnea Martínez PA-C    Current Outpatient Medications:     albuterol (PROVENTIL) (2.5 MG/3ML) 0.083% nebulizer solution, , Disp: , Rfl: 1    amitriptyline (ELAVIL) 25 MG tablet, Take 1 tablet by mouth At Night As Needed for Sleep., Disp: , Rfl:     amLODIPine (NORVASC) 10 MG tablet, 1 tablet Daily., Disp: , Rfl: 0    amLODIPine (NORVASC) 10 MG tablet, Take  by mouth., Disp: , Rfl:     ARMOUR THYROID 300 MG tablet, 1 tablet Daily., Disp: , Rfl: 0    Aspirin 81 MG capsule, Take 81 mg by mouth., Disp: , Rfl:     aspirin 81 MG tablet, Take 1 tablet by mouth Daily., Disp: , Rfl:     azithromycin (Zithromax Z-Wyatt) 250 MG tablet, Take 2 tablets by mouth on day 1, then 1 tablet daily on days 2-5, Disp: 6 tablet, Rfl: 0    benzonatate (TESSALON) 200 MG capsule, Take 1 capsule by mouth 2 (Two) Times a Day As Needed for Cough., Disp: 30 capsule, Rfl: 0    BYSTOLIC 20 MG tablet, , Disp: , Rfl:     carvedilol (COREG) 25 MG tablet, Take 1 tablet by mouth., Disp: , Rfl:     clotrimazole (LOTRIMIN) 1 % cream, Apply  topically 2 (Two) Times a Day., Disp: , Rfl:     eplerenone (INSPRA) 50 MG tablet, Take  by mouth., Disp: , Rfl:     famotidine (PEPCID) 20 MG tablet, Take 1 tablet by mouth., Disp: , Rfl:     fluticasone (FLONASE) 50 MCG/ACT nasal spray, Administer 2 sprays in each nostril DAILY, Disp: 1 bottle, Rfl: 10    fluticasone (FLOVENT HFA) 110 MCG/ACT inhaler, Inhale 2 puffs 2 (Two) Times a Day., Disp: 12 g, Rfl: 0    hydrALAZINE (APRESOLINE) 100 MG tablet, Take 1 tablet by mouth 3 (Three) Times a Day., Disp: , Rfl:     hydrochlorothiazide (HYDRODIURIL) 25 MG tablet, Take 1 tablet by mouth Daily., Disp: , Rfl:     hydrOXYzine (ATARAX) 25 MG tablet, Take 1 tablet by mouth., Disp: , Rfl:     indomethacin (INDOCIN) 50 MG capsule, , Disp: , Rfl: 6    ipratropium (ATROVENT) 0.03 % nasal spray, 2 sprays into each nostril Every 12 (Twelve) Hours., Disp: 1 each, Rfl: 12     isosorbide mononitrate (IMDUR) 60 MG 24 hr tablet, Daily., Disp: , Rfl:     levocetirizine (XYZAL) 5 MG tablet, Take 1 tablet by mouth Every Evening., Disp: , Rfl:     levocetirizine (XYZAL) 5 MG tablet, Take 1 tablet by mouth Daily., Disp: , Rfl:     levothyroxine (SYNTHROID, LEVOTHROID) 137 MCG tablet, Take 1 tablet by mouth Daily., Disp: , Rfl:     methylPREDNISolone (MEDROL) 4 MG dose pack, Take as directed on package instructions., Disp: 21 tablet, Rfl: 0    montelukast (SINGULAIR) 10 MG tablet, Take 1 tablet by mouth Every Night., Disp: 30 tablet, Rfl: 11    naproxen (NAPROSYN) 500 MG tablet, 2 (Two) Times a Day With Meals., Disp: , Rfl: 1    nitroglycerin (NITROSTAT) 0.4 MG SL tablet, Place 1 tablet under the tongue As Needed for Chest Pain. Take no more than 3 doses in 15 minutes., Disp: , Rfl:     raNITIdine (ZANTAC) 150 MG tablet, 2 (Two) Times a Day., Disp: , Rfl: 0    rivaroxaban (XARELTO) 20 MG tablet, Take 1 tablet by mouth., Disp: , Rfl:     spironolactone (ALDACTONE) 50 MG tablet, 1 tablet 2 (Two) Times a Day., Disp: , Rfl: 0    terazosin (HYTRIN) 5 MG capsule, Take 1 capsule by mouth Daily., Disp: , Rfl:     triamcinolone (KENALOG) 0.1 % cream, Apply  topically 2 (Two) Times a Day., Disp: , Rfl:     VENTOLIN  (90 Base) MCG/ACT inhaler, 4 (Four) Times a Day As Needed., Disp: , Rfl: 1    ALLERGIES     Hydrocodone-acetaminophen, Oxycodone-acetaminophen, Sulfamethoxazole-trimethoprim, Apixaban, Lortab [hydrocodone-acetaminophen], Adhesive tape, and Penicillins    FAMILY HISTORY       Family History   Problem Relation Age of Onset    Liver disease Brother     Lung disease Brother      SOCIAL HISTORY       Social History     Socioeconomic History    Marital status: Single   Tobacco Use    Smoking status: Never    Smokeless tobacco: Never   Vaping Use    Vaping status: Never Used   Substance and Sexual Activity    Alcohol use: No    Drug use: No    Sexual activity: Defer     PHYSICAL EXAM    (up to  7 for level 4, 8 or more for level 5)   Physical Exam  Vitals and nursing note reviewed. Exam conducted with a chaperone present.   HENT:      Head: Normocephalic and atraumatic.   Eyes:      Extraocular Movements: Extraocular movements intact.      Pupils: Pupils are equal, round, and reactive to light.   Cardiovascular:      Rate and Rhythm: Normal rate and regular rhythm.      Pulses: Normal pulses.   Pulmonary:      Effort: Pulmonary effort is normal.      Breath sounds: Wheezing present.   Abdominal:      General: Abdomen is flat. Bowel sounds are normal.      Palpations: Abdomen is soft.   Musculoskeletal:         General: Normal range of motion.      Cervical back: Normal range of motion.   Skin:     General: Skin is warm and dry.   Neurological:      General: No focal deficit present.      Mental Status: She is alert and oriented to person, place, and time.   Psychiatric:         Mood and Affect: Mood normal.         Behavior: Behavior normal.       DIAGNOSTIC RESULTS     EKG: All EKGs are interpreted by the Emergency Department Physician who either signs or Co-signs this chart in the absence of a cardiologist.    ECG 12 Lead ED Triage Standing Order; SOA   Preliminary Result   Test Reason : ED Triage Standing Order~   Blood Pressure :   */*   mmHG   Vent. Rate :  68 BPM     Atrial Rate :  68 BPM      P-R Int : 146 ms          QRS Dur :  72 ms       QT Int : 394 ms       P-R-T Axes :  71  43  46 degrees      QTc Int : 418 ms      Normal sinus rhythm   T wave abnormality, consider anterolateral ischemia   Abnormal ECG   No previous ECGs available      Referred By:            Confirmed By:       Telemetry Scan   Final Result        RADIOLOGY:   Non-plain film images such as CT, Ultrasound and MRI are read by the radiologist. Plain radiographic images are visualized and preliminarily interpreted by the emergency physician with the below findings:    [x] Radiologist's Report Reviewed:  XR Chest 1 View   Final  Result   Impression:   No evidence of acute cardiopulmonary disease.          Electronically Signed: Braeden Echavarria MD     8/28/2024 1:20 PM EDT     Workstation ID: [id number]          ED BEDSIDE ULTRASOUND:   Performed by ED Physician - none    LABS:    I have reviewed and interpreted all of the currently available lab results from this visit (if applicable):  Results for orders placed or performed during the hospital encounter of 08/28/24   Respiratory Panel PCR w/COVID-19(SARS-CoV-2) YODIT/TRIXIE/BENJAMIN/PAD/COR/OTILIA In-House, NP Swab in UTM/VTM, 2 HR TAT - Swab, Nasopharynx    Specimen: Nasopharynx; Swab   Result Value Ref Range    ADENOVIRUS, PCR Not Detected Not Detected    Coronavirus 229E Not Detected Not Detected    Coronavirus HKU1 Not Detected Not Detected    Coronavirus NL63 Not Detected Not Detected    Coronavirus OC43 Not Detected Not Detected    COVID19 Not Detected Not Detected - Ref. Range    Human Metapneumovirus Not Detected Not Detected    Human Rhinovirus/Enterovirus Not Detected Not Detected    Influenza A PCR Not Detected Not Detected    Influenza B PCR Not Detected Not Detected    Parainfluenza Virus 1 Not Detected Not Detected    Parainfluenza Virus 2 Not Detected Not Detected    Parainfluenza Virus 3 Not Detected Not Detected    Parainfluenza Virus 4 Not Detected Not Detected    RSV, PCR Not Detected Not Detected    Bordetella pertussis pcr Not Detected Not Detected    Bordetella parapertussis PCR Not Detected Not Detected    Chlamydophila pneumoniae PCR Not Detected Not Detected    Mycoplasma pneumo by PCR Not Detected Not Detected   Comprehensive Metabolic Panel    Specimen: Blood   Result Value Ref Range    Glucose 94 65 - 99 mg/dL    BUN 20 8 - 23 mg/dL    Creatinine 0.84 0.57 - 1.00 mg/dL    Sodium 140 136 - 145 mmol/L    Potassium 4.2 3.5 - 5.2 mmol/L    Chloride 104 98 - 107 mmol/L    CO2 25.7 22.0 - 29.0 mmol/L    Calcium 9.7 8.6 - 10.5 mg/dL    Total Protein 7.9 6.0 - 8.5 g/dL    Albumin 4.3  3.5 - 5.2 g/dL    ALT (SGPT) <5 1 - 33 U/L    AST (SGOT) 19 1 - 32 U/L    Alkaline Phosphatase 81 39 - 117 U/L    Total Bilirubin 0.7 0.0 - 1.2 mg/dL    Globulin 3.6 gm/dL    A/G Ratio 1.2 g/dL    BUN/Creatinine Ratio 23.8 7.0 - 25.0    Anion Gap 10.3 5.0 - 15.0 mmol/L    eGFR 77.2 >60.0 mL/min/1.73   BNP    Specimen: Blood   Result Value Ref Range    proBNP 122.0 0.0 - 900.0 pg/mL   Single High Sensitivity Troponin T    Specimen: Blood   Result Value Ref Range    HS Troponin T 10 <14 ng/L   CBC Auto Differential    Specimen: Blood   Result Value Ref Range    WBC 6.50 3.40 - 10.80 10*3/mm3    RBC 5.82 (H) 3.77 - 5.28 10*6/mm3    Hemoglobin 12.1 12.0 - 15.9 g/dL    Hematocrit 38.2 34.0 - 46.6 %    MCV 65.6 (L) 79.0 - 97.0 fL    MCH 20.8 (L) 26.6 - 33.0 pg    MCHC 31.7 31.5 - 35.7 g/dL    RDW 21.8 (H) 12.3 - 15.4 %    RDW-SD 49.0 37.0 - 54.0 fl    Platelets 274 140 - 450 10*3/mm3    Neutrophil % 64.6 42.7 - 76.0 %    Lymphocyte % 25.5 19.6 - 45.3 %    Monocyte % 5.2 5.0 - 12.0 %    Eosinophil % 4.2 0.3 - 6.2 %    Basophil % 0.5 0.0 - 1.5 %    Immature Grans % 0.0 0.0 - 0.5 %    Neutrophils, Absolute 4.20 1.70 - 7.00 10*3/mm3    Lymphocytes, Absolute 1.66 0.70 - 3.10 10*3/mm3    Monocytes, Absolute 0.34 0.10 - 0.90 10*3/mm3    Eosinophils, Absolute 0.27 0.00 - 0.40 10*3/mm3    Basophils, Absolute 0.03 0.00 - 0.20 10*3/mm3    Immature Grans, Absolute 0.00 0.00 - 0.05 10*3/mm3   Scan Slide    Specimen: Blood   Result Value Ref Range    Microcytes Slight/1+ None Seen    WBC Morphology Normal Normal    Platelet Morphology Normal Normal   ECG 12 Lead ED Triage Standing Order; SOA   Result Value Ref Range    QT Interval 394 ms    QTC Interval 418 ms   Green Top (Gel)   Result Value Ref Range    Extra Tube Hold for add-ons.    Lavender Top   Result Value Ref Range    Extra Tube hold for add-on    Gold Top - SST   Result Value Ref Range    Extra Tube Hold for add-ons.    Gray Top   Result Value Ref Range    Extra Tube Hold for  add-ons.    Light Blue Top   Result Value Ref Range    Extra Tube Hold for add-ons.       All other labs were within normal range or not returned as of this dictation.    EMERGENCY DEPARTMENT COURSE and DIFFERENTIAL DIAGNOSIS/MDM:   Vitals:    Vitals:    08/28/24 1349 08/28/24 1400 08/28/24 1500 08/28/24 1530   BP:  130/82 123/81 125/80   BP Location:       Patient Position:       Pulse: 65 69 54 63   Resp:       Temp:       TempSrc:       SpO2: 98% 99% 96% 97%   Weight:       Height:           ED Course as of 08/28/24 1623   Wed Aug 28, 2024   1343 WBC: 6.50 [WA]   1343 Hemoglobin: 12.1 [WA]   1343 Hematocrit: 38.2 [WA]   1343 BUN: 20 [WA]   1343 Creatinine: 0.84 [WA]   1343 Sodium: 140 [WA]   1343 Potassium: 4.2 [WA]   1343 HS Troponin T: 10 [WA]      ED Course User Index  [WA] Linnea Martínez PA-C     MDM  Patient Progress  Patient progress: improved (Patient's symptoms improved after her DuoNeb and Solu-Medrol)       I had a discussion with the patient/family regarding diagnosis, diagnostic results, treatment plan, and medications.  The patient/family indicated understanding of these instructions.  I spent adequate time at the bedside preceding discharge necessary to personally discuss the aftercare instructions, giving patient education, providing explanations of the results of our evaluations/findings, and my decision making to assure that the patient/family understand the plan of care.  Time was allotted to answer questions at that time and throughout the ED course.  Emphasis was placed on timely follow-up after discharge.  I also discussed the potential for the development of an acute emergent condition requiring further evaluation, admission, or even surgical intervention. I discussed that we found nothing during the visit today indicating the need for further workup, admission, or the presence of an unstable medical condition.  I encouraged the patient to return to the emergency department immediately  for ANY concerns, worsening, new complaints, or if symptoms persist and unable to seek follow-up in a timely fashion.  The patient/family expressed understanding and agreement with this plan.  The patient will follow-up with her PCP for reevaluation.     MEDICATIONS ADMINISTERED IN ED:  Medications   sodium chloride 0.9 % flush 10 mL (has no administration in time range)   sodium chloride 0.9 % flush 10 mL (has no administration in time range)   ipratropium-albuterol (DUO-NEB) nebulizer solution 3 mL (3 mL Nebulization Given 8/28/24 1349)   methylPREDNISolone sodium succinate (SOLU-Medrol) injection 125 mg (125 mg Intravenous Given 8/28/24 1407)     PROCEDURES:  Procedures:none       Total Critical Care time was 0 minutes, excluding separately reportable procedures.   There was a high probability of clinically significant/life threatening deterioration in the patient's condition which required my urgent intervention.    FINAL IMPRESSION      1. Mild asthma with exacerbation, unspecified whether persistent        DISPOSITION/PLAN     ED Disposition       ED Disposition   Discharge    Condition   Stable    Comment   --             PATIENT REFERRED TO:  Arlin Pacheco, ROMERO  [facility] [employer] [facility] [address] [phone]          DISCHARGE MEDICATIONS:     Medication List        START taking these medications      azithromycin 250 MG tablet  Commonly known as: Zithromax Z-Wyatt  Take 2 tablets by mouth on day 1, then 1 tablet daily on days 2-5     methylPREDNISolone 4 MG dose pack  Commonly known as: MEDROL  Take as directed on package instructions.            CONTINUE taking these medications      amitriptyline 25 MG tablet  Commonly known as: ELAVIL     * amLODIPine 10 MG tablet  Commonly known as: NORVASC     * amLODIPine 10 MG tablet  Commonly known as: NORVASC     Natalya Thyroid 300 MG tablet  Generic drug: thyroid     * aspirin 81 MG tablet     * Aspirin 81 MG capsule     benzonatate 200 MG  capsule  Commonly known as: TESSALON  Take 1 capsule by mouth 2 (Two) Times a Day As Needed for Cough.     Bystolic 20 MG tablet  Generic drug: nebivolol     carvedilol 25 MG tablet  Commonly known as: COREG     clotrimazole 1 % cream  Commonly known as: LOTRIMIN     eplerenone 50 MG tablet  Commonly known as: INSPRA     famotidine 20 MG tablet  Commonly known as: PEPCID     fluticasone 110 MCG/ACT inhaler  Commonly known as: FLOVENT HFA  Inhale 2 puffs 2 (Two) Times a Day.     fluticasone 50 MCG/ACT nasal spray  Commonly known as: Flonase  Administer 2 sprays in each nostril DAILY     hydrALAZINE 100 MG tablet  Commonly known as: APRESOLINE     hydroCHLOROthiazide 25 MG tablet     hydrOXYzine 25 MG tablet  Commonly known as: ATARAX     indomethacin 50 MG capsule  Commonly known as: INDOCIN     ipratropium 0.03 % nasal spray  Commonly known as: ATROVENT  2 sprays into each nostril Every 12 (Twelve) Hours.     isosorbide mononitrate 60 MG 24 hr tablet  Commonly known as: IMDUR     * levocetirizine 5 MG tablet  Commonly known as: XYZAL     * levocetirizine 5 MG tablet  Commonly known as: XYZAL     levothyroxine 137 MCG tablet  Commonly known as: SYNTHROID, LEVOTHROID     montelukast 10 MG tablet  Commonly known as: SINGULAIR  Take 1 tablet by mouth Every Night.     naproxen 500 MG tablet  Commonly known as: NAPROSYN     nitroglycerin 0.4 MG SL tablet  Commonly known as: NITROSTAT     raNITIdine 150 MG tablet  Commonly known as: ZANTAC     rivaroxaban 20 MG tablet  Commonly known as: XARELTO     spironolactone 50 MG tablet  Commonly known as: ALDACTONE     terazosin 5 MG capsule  Commonly known as: HYTRIN     triamcinolone 0.1 % cream  Commonly known as: KENALOG     * Ventolin  (90 Base) MCG/ACT inhaler  Generic drug: albuterol sulfate HFA     * albuterol (2.5 MG/3ML) 0.083% nebulizer solution  Commonly known as: PROVENTIL           * This list has 8 medication(s) that are the same as other medications  prescribed for you. Read the directions carefully, and ask your doctor or other care provider to review them with you.                   Where to Get Your Medications        These medications were sent to [facility] [id number] - [place], [place] - [address] [place] [address] - [phone]  - [phone]   [address] [address]      Phone: [phone]   azithromycin 250 MG tablet  methylPREDNISolone 4 MG dose pack         Comment: Please note this report has been produced using speech recognition software.      Linnea Martínez PA-C

## 2024-09-04 LAB
QT INTERVAL: 394 MS
QTC INTERVAL: 418 MS

## 2025-06-13 ENCOUNTER — APPOINTMENT (OUTPATIENT)
Facility: HOSPITAL | Age: 65
End: 2025-06-13
Payer: MEDICARE

## 2025-06-13 ENCOUNTER — HOSPITAL ENCOUNTER (OUTPATIENT)
Facility: HOSPITAL | Age: 65
Setting detail: OBSERVATION
Discharge: HOME | End: 2025-06-14
Attending: STUDENT IN AN ORGANIZED HEALTH CARE EDUCATION/TRAINING PROGRAM | Admitting: EMERGENCY MEDICINE
Payer: MEDICARE

## 2025-06-13 DIAGNOSIS — I16.0 HYPERTENSIVE URGENCY: ICD-10-CM

## 2025-06-13 DIAGNOSIS — J45.41 MODERATE PERSISTENT ASTHMA WITH EXACERBATION: Primary | ICD-10-CM

## 2025-06-13 PROBLEM — J45.901 ASTHMA EXACERBATION: Status: ACTIVE | Noted: 2025-06-13

## 2025-06-13 LAB
ALBUMIN SERPL-MCNC: 4.3 G/DL (ref 3.5–5.2)
ALBUMIN/GLOB SERPL: 1.3 G/DL
ALP SERPL-CCNC: 76 U/L (ref 39–117)
ALT SERPL W P-5'-P-CCNC: 6 U/L (ref 1–33)
ANION GAP SERPL CALCULATED.3IONS-SCNC: 12.2 MMOL/L (ref 5–15)
AST SERPL-CCNC: 16 U/L (ref 1–32)
B PARAPERT DNA SPEC QL NAA+PROBE: NOT DETECTED
B PERT DNA SPEC QL NAA+PROBE: NOT DETECTED
BASOPHILS # BLD AUTO: 0.02 10*3/MM3 (ref 0–0.2)
BASOPHILS NFR BLD AUTO: 0.3 % (ref 0–1.5)
BILIRUB SERPL-MCNC: 0.5 MG/DL (ref 0–1.2)
BUN SERPL-MCNC: 12.3 MG/DL (ref 8–23)
BUN/CREAT SERPL: 13.4 (ref 7–25)
C PNEUM DNA NPH QL NAA+NON-PROBE: NOT DETECTED
CALCIUM SPEC-SCNC: 9.6 MG/DL (ref 8.6–10.5)
CHLORIDE SERPL-SCNC: 105 MMOL/L (ref 98–107)
CO2 SERPL-SCNC: 22.8 MMOL/L (ref 22–29)
CREAT SERPL-MCNC: 0.92 MG/DL (ref 0.57–1)
DEPRECATED RDW RBC AUTO: 49.2 FL (ref 37–54)
EGFRCR SERPLBLD CKD-EPI 2021: 69.2 ML/MIN/1.73
ELLIPTOCYTES BLD QL SMEAR: NORMAL
EOSINOPHIL # BLD AUTO: 0.07 10*3/MM3 (ref 0–0.4)
EOSINOPHIL NFR BLD AUTO: 1.1 % (ref 0.3–6.2)
ERYTHROCYTE [DISTWIDTH] IN BLOOD BY AUTOMATED COUNT: 22.2 % (ref 12.3–15.4)
FLUAV SUBTYP SPEC NAA+PROBE: NOT DETECTED
FLUBV RNA ISLT QL NAA+PROBE: NOT DETECTED
GEN 5 1HR TROPONIN T REFLEX: 12 NG/L
GIANT PLATELETS: NORMAL
GLOBULIN UR ELPH-MCNC: 3.4 GM/DL
GLUCOSE SERPL-MCNC: 112 MG/DL (ref 65–99)
HADV DNA SPEC NAA+PROBE: NOT DETECTED
HCOV 229E RNA SPEC QL NAA+PROBE: NOT DETECTED
HCOV HKU1 RNA SPEC QL NAA+PROBE: NOT DETECTED
HCOV NL63 RNA SPEC QL NAA+PROBE: NOT DETECTED
HCOV OC43 RNA SPEC QL NAA+PROBE: NOT DETECTED
HCT VFR BLD AUTO: 36.6 % (ref 34–46.6)
HGB BLD-MCNC: 11.4 G/DL (ref 12–15.9)
HMPV RNA NPH QL NAA+NON-PROBE: NOT DETECTED
HOLD SPECIMEN: NORMAL
HPIV1 RNA ISLT QL NAA+PROBE: NOT DETECTED
HPIV2 RNA SPEC QL NAA+PROBE: NOT DETECTED
HPIV3 RNA NPH QL NAA+PROBE: DETECTED
HPIV4 P GENE NPH QL NAA+PROBE: NOT DETECTED
IMM GRANULOCYTES # BLD AUTO: 0.01 10*3/MM3 (ref 0–0.05)
IMM GRANULOCYTES NFR BLD AUTO: 0.2 % (ref 0–0.5)
LYMPHOCYTES # BLD AUTO: 0.71 10*3/MM3 (ref 0.7–3.1)
LYMPHOCYTES NFR BLD AUTO: 11.3 % (ref 19.6–45.3)
M PNEUMO IGG SER IA-ACNC: NOT DETECTED
MAGNESIUM SERPL-MCNC: 2 MG/DL (ref 1.6–2.4)
MCH RBC QN AUTO: 20.3 PG (ref 26.6–33)
MCHC RBC AUTO-ENTMCNC: 31.1 G/DL (ref 31.5–35.7)
MCV RBC AUTO: 65.2 FL (ref 79–97)
MONOCYTES # BLD AUTO: 0.55 10*3/MM3 (ref 0.1–0.9)
MONOCYTES NFR BLD AUTO: 8.8 % (ref 5–12)
NEUTROPHILS NFR BLD AUTO: 4.9 10*3/MM3 (ref 1.7–7)
NEUTROPHILS NFR BLD AUTO: 78.3 % (ref 42.7–76)
NT-PROBNP SERPL-MCNC: 578 PG/ML (ref 0–900)
PLATELET # BLD AUTO: 236 10*3/MM3 (ref 140–450)
POLYCHROMASIA BLD QL SMEAR: NORMAL
POTASSIUM SERPL-SCNC: 3.7 MMOL/L (ref 3.5–5.2)
PROT SERPL-MCNC: 7.7 G/DL (ref 6–8.5)
QT INTERVAL: 344 MS
QT INTERVAL: 388 MS
QTC INTERVAL: 376 MS
QTC INTERVAL: 400 MS
RBC # BLD AUTO: 5.61 10*6/MM3 (ref 3.77–5.28)
RHINOVIRUS RNA SPEC NAA+PROBE: NOT DETECTED
RSV RNA NPH QL NAA+NON-PROBE: NOT DETECTED
SARS-COV-2 RNA RESP QL NAA+PROBE: NOT DETECTED
SMALL PLATELETS BLD QL SMEAR: ADEQUATE
SODIUM SERPL-SCNC: 140 MMOL/L (ref 136–145)
TROPONIN T % DELTA: -14
TROPONIN T NUMERIC DELTA: -2 NG/L
TROPONIN T SERPL HS-MCNC: 14 NG/L
WBC MORPH BLD: NORMAL
WBC NRBC COR # BLD AUTO: 6.26 10*3/MM3 (ref 3.4–10.8)
WHOLE BLOOD HOLD COAG: NORMAL
WHOLE BLOOD HOLD SPECIMEN: NORMAL

## 2025-06-13 PROCEDURE — 25010000002 METHYLPREDNISOLONE PER 125 MG: Performed by: STUDENT IN AN ORGANIZED HEALTH CARE EDUCATION/TRAINING PROGRAM

## 2025-06-13 PROCEDURE — 71045 X-RAY EXAM CHEST 1 VIEW: CPT

## 2025-06-13 PROCEDURE — 85025 COMPLETE CBC W/AUTO DIFF WBC: CPT | Performed by: STUDENT IN AN ORGANIZED HEALTH CARE EDUCATION/TRAINING PROGRAM

## 2025-06-13 PROCEDURE — 36415 COLL VENOUS BLD VENIPUNCTURE: CPT

## 2025-06-13 PROCEDURE — 25510000001 IOPAMIDOL PER 1 ML: Performed by: PHYSICIAN ASSISTANT

## 2025-06-13 PROCEDURE — 93005 ELECTROCARDIOGRAM TRACING: CPT | Performed by: STUDENT IN AN ORGANIZED HEALTH CARE EDUCATION/TRAINING PROGRAM

## 2025-06-13 PROCEDURE — 94761 N-INVAS EAR/PLS OXIMETRY MLT: CPT

## 2025-06-13 PROCEDURE — 94799 UNLISTED PULMONARY SVC/PX: CPT

## 2025-06-13 PROCEDURE — 71275 CT ANGIOGRAPHY CHEST: CPT

## 2025-06-13 PROCEDURE — 25810000003 SODIUM CHLORIDE 0.9 % SOLUTION: Performed by: PHYSICIAN ASSISTANT

## 2025-06-13 PROCEDURE — G0378 HOSPITAL OBSERVATION PER HR: HCPCS

## 2025-06-13 PROCEDURE — 80053 COMPREHEN METABOLIC PANEL: CPT | Performed by: STUDENT IN AN ORGANIZED HEALTH CARE EDUCATION/TRAINING PROGRAM

## 2025-06-13 PROCEDURE — 93005 ELECTROCARDIOGRAM TRACING: CPT

## 2025-06-13 PROCEDURE — 99291 CRITICAL CARE FIRST HOUR: CPT | Performed by: STUDENT IN AN ORGANIZED HEALTH CARE EDUCATION/TRAINING PROGRAM

## 2025-06-13 PROCEDURE — 83880 ASSAY OF NATRIURETIC PEPTIDE: CPT | Performed by: STUDENT IN AN ORGANIZED HEALTH CARE EDUCATION/TRAINING PROGRAM

## 2025-06-13 PROCEDURE — 0202U NFCT DS 22 TRGT SARS-COV-2: CPT | Performed by: PHYSICIAN ASSISTANT

## 2025-06-13 PROCEDURE — 25010000002 MAGNESIUM SULFATE 2 GM/50ML SOLUTION: Performed by: EMERGENCY MEDICINE

## 2025-06-13 PROCEDURE — 25010000002 HYDRALAZINE PER 20 MG: Performed by: EMERGENCY MEDICINE

## 2025-06-13 PROCEDURE — 83735 ASSAY OF MAGNESIUM: CPT | Performed by: PHYSICIAN ASSISTANT

## 2025-06-13 PROCEDURE — 99285 EMERGENCY DEPT VISIT HI MDM: CPT

## 2025-06-13 PROCEDURE — 96375 TX/PRO/DX INJ NEW DRUG ADDON: CPT

## 2025-06-13 PROCEDURE — 96365 THER/PROPH/DIAG IV INF INIT: CPT

## 2025-06-13 PROCEDURE — 84484 ASSAY OF TROPONIN QUANT: CPT | Performed by: STUDENT IN AN ORGANIZED HEALTH CARE EDUCATION/TRAINING PROGRAM

## 2025-06-13 PROCEDURE — 94640 AIRWAY INHALATION TREATMENT: CPT

## 2025-06-13 PROCEDURE — 85007 BL SMEAR W/DIFF WBC COUNT: CPT | Performed by: STUDENT IN AN ORGANIZED HEALTH CARE EDUCATION/TRAINING PROGRAM

## 2025-06-13 RX ORDER — IOPAMIDOL 755 MG/ML
100 INJECTION, SOLUTION INTRAVASCULAR
Status: COMPLETED | OUTPATIENT
Start: 2025-06-13 | End: 2025-06-13

## 2025-06-13 RX ORDER — PREDNISONE 50 MG/1
50 TABLET ORAL DAILY
Qty: 4 TABLET | Refills: 0 | Status: SHIPPED | OUTPATIENT
Start: 2025-06-13 | End: 2025-06-17

## 2025-06-13 RX ORDER — ALBUTEROL SULFATE 0.83 MG/ML
2.5 SOLUTION RESPIRATORY (INHALATION) ONCE
Status: COMPLETED | OUTPATIENT
Start: 2025-06-13 | End: 2025-06-13

## 2025-06-13 RX ORDER — HYDRALAZINE HYDROCHLORIDE 20 MG/ML
20 INJECTION INTRAMUSCULAR; INTRAVENOUS ONCE
Status: COMPLETED | OUTPATIENT
Start: 2025-06-13 | End: 2025-06-13

## 2025-06-13 RX ORDER — ACETAMINOPHEN 325 MG/1
650 TABLET ORAL EVERY 6 HOURS PRN
Status: DISCONTINUED | OUTPATIENT
Start: 2025-06-13 | End: 2025-06-14 | Stop reason: HOSPADM

## 2025-06-13 RX ORDER — IBUPROFEN 200 MG
600 TABLET ORAL ONCE
Status: DISCONTINUED | OUTPATIENT
Start: 2025-06-13 | End: 2025-06-13

## 2025-06-13 RX ORDER — MAGNESIUM SULFATE HEPTAHYDRATE 40 MG/ML
2 INJECTION, SOLUTION INTRAVENOUS ONCE
Status: COMPLETED | OUTPATIENT
Start: 2025-06-13 | End: 2025-06-13

## 2025-06-13 RX ORDER — ACETAMINOPHEN 500 MG
1000 TABLET ORAL ONCE
Status: COMPLETED | OUTPATIENT
Start: 2025-06-13 | End: 2025-06-13

## 2025-06-13 RX ORDER — SODIUM CHLORIDE 9 MG/ML
40 INJECTION, SOLUTION INTRAVENOUS AS NEEDED
Status: DISCONTINUED | OUTPATIENT
Start: 2025-06-13 | End: 2025-06-14 | Stop reason: HOSPADM

## 2025-06-13 RX ORDER — METHYLPREDNISOLONE SODIUM SUCCINATE 125 MG/2ML
125 INJECTION, POWDER, LYOPHILIZED, FOR SOLUTION INTRAMUSCULAR; INTRAVENOUS ONCE
Status: COMPLETED | OUTPATIENT
Start: 2025-06-14 | End: 2025-06-14

## 2025-06-13 RX ORDER — SODIUM CHLORIDE 9 MG/ML
75 INJECTION, SOLUTION INTRAVENOUS CONTINUOUS
Status: DISCONTINUED | OUTPATIENT
Start: 2025-06-13 | End: 2025-06-14 | Stop reason: HOSPADM

## 2025-06-13 RX ORDER — IPRATROPIUM BROMIDE AND ALBUTEROL SULFATE 2.5; .5 MG/3ML; MG/3ML
3 SOLUTION RESPIRATORY (INHALATION) EVERY 4 HOURS PRN
Status: DISCONTINUED | OUTPATIENT
Start: 2025-06-13 | End: 2025-06-13

## 2025-06-13 RX ORDER — SODIUM CHLORIDE 0.9 % (FLUSH) 0.9 %
10 SYRINGE (ML) INJECTION EVERY 12 HOURS SCHEDULED
Status: DISCONTINUED | OUTPATIENT
Start: 2025-06-13 | End: 2025-06-14 | Stop reason: HOSPADM

## 2025-06-13 RX ORDER — SODIUM CHLORIDE 0.9 % (FLUSH) 0.9 %
10 SYRINGE (ML) INJECTION AS NEEDED
Status: DISCONTINUED | OUTPATIENT
Start: 2025-06-13 | End: 2025-06-14

## 2025-06-13 RX ORDER — ALBUTEROL SULFATE 0.83 MG/ML
10 SOLUTION RESPIRATORY (INHALATION) CONTINUOUS
Status: DISCONTINUED | OUTPATIENT
Start: 2025-06-13 | End: 2025-06-13

## 2025-06-13 RX ORDER — SODIUM CHLORIDE 0.9 % (FLUSH) 0.9 %
10 SYRINGE (ML) INJECTION AS NEEDED
Status: DISCONTINUED | OUTPATIENT
Start: 2025-06-13 | End: 2025-06-14 | Stop reason: HOSPADM

## 2025-06-13 RX ORDER — IPRATROPIUM BROMIDE AND ALBUTEROL SULFATE 2.5; .5 MG/3ML; MG/3ML
3 SOLUTION RESPIRATORY (INHALATION)
Status: DISCONTINUED | OUTPATIENT
Start: 2025-06-13 | End: 2025-06-14 | Stop reason: HOSPADM

## 2025-06-13 RX ORDER — SODIUM CHLORIDE 9 MG/ML
75 INJECTION, SOLUTION INTRAVENOUS CONTINUOUS
Status: DISCONTINUED | OUTPATIENT
Start: 2025-06-13 | End: 2025-06-13

## 2025-06-13 RX ORDER — ONDANSETRON 2 MG/ML
4 INJECTION INTRAMUSCULAR; INTRAVENOUS EVERY 6 HOURS PRN
Status: DISCONTINUED | OUTPATIENT
Start: 2025-06-13 | End: 2025-06-14 | Stop reason: HOSPADM

## 2025-06-13 RX ORDER — HYDROXYZINE HYDROCHLORIDE 25 MG/1
50 TABLET, FILM COATED ORAL ONCE
Status: COMPLETED | OUTPATIENT
Start: 2025-06-13 | End: 2025-06-13

## 2025-06-13 RX ORDER — HYDRALAZINE HYDROCHLORIDE 25 MG/1
100 TABLET, FILM COATED ORAL ONCE
Status: DISCONTINUED | OUTPATIENT
Start: 2025-06-13 | End: 2025-06-13

## 2025-06-13 RX ORDER — IPRATROPIUM BROMIDE AND ALBUTEROL SULFATE 2.5; .5 MG/3ML; MG/3ML
3 SOLUTION RESPIRATORY (INHALATION) ONCE
Status: COMPLETED | OUTPATIENT
Start: 2025-06-13 | End: 2025-06-13

## 2025-06-13 RX ORDER — METHYLPREDNISOLONE SODIUM SUCCINATE 125 MG/2ML
90 INJECTION INTRAMUSCULAR; INTRAVENOUS ONCE
Status: COMPLETED | OUTPATIENT
Start: 2025-06-13 | End: 2025-06-13

## 2025-06-13 RX ADMIN — IPRATROPIUM BROMIDE AND ALBUTEROL SULFATE 3 ML: 2.5; .5 SOLUTION RESPIRATORY (INHALATION) at 22:31

## 2025-06-13 RX ADMIN — ALBUTEROL SULFATE 10 MG: 2.5 SOLUTION RESPIRATORY (INHALATION) at 08:32

## 2025-06-13 RX ADMIN — SODIUM CHLORIDE 75 ML/HR: 9 INJECTION, SOLUTION INTRAVENOUS at 18:22

## 2025-06-13 RX ADMIN — MAGNESIUM SULFATE HEPTAHYDRATE 2 G: 40 INJECTION, SOLUTION INTRAVENOUS at 07:36

## 2025-06-13 RX ADMIN — IPRATROPIUM BROMIDE AND ALBUTEROL SULFATE 3 ML: 2.5; .5 SOLUTION RESPIRATORY (INHALATION) at 12:09

## 2025-06-13 RX ADMIN — IOPAMIDOL 85 ML: 755 INJECTION, SOLUTION INTRAVENOUS at 11:59

## 2025-06-13 RX ADMIN — IPRATROPIUM BROMIDE AND ALBUTEROL SULFATE 3 ML: 2.5; .5 SOLUTION RESPIRATORY (INHALATION) at 18:18

## 2025-06-13 RX ADMIN — ACETAMINOPHEN 1000 MG: 500 TABLET, FILM COATED ORAL at 12:44

## 2025-06-13 RX ADMIN — METHYLPREDNISOLONE SODIUM SUCCINATE 90 MG: 125 INJECTION, POWDER, FOR SOLUTION INTRAMUSCULAR; INTRAVENOUS at 06:31

## 2025-06-13 RX ADMIN — ACETAMINOPHEN 650 MG: 325 TABLET ORAL at 18:43

## 2025-06-13 RX ADMIN — IPRATROPIUM BROMIDE AND ALBUTEROL SULFATE 3 ML: 2.5; .5 SOLUTION RESPIRATORY (INHALATION) at 06:14

## 2025-06-13 RX ADMIN — HYDRALAZINE HYDROCHLORIDE 20 MG: 20 INJECTION INTRAMUSCULAR; INTRAVENOUS at 07:35

## 2025-06-13 RX ADMIN — HYDROXYZINE HYDROCHLORIDE 50 MG: 25 TABLET, FILM COATED ORAL at 18:22

## 2025-06-13 RX ADMIN — ALBUTEROL SULFATE 2.5 MG: 2.5 SOLUTION RESPIRATORY (INHALATION) at 07:33

## 2025-06-13 NOTE — FSED PROVIDER NOTE
Subjective   History of Present Illness  64-year-old female with a history of asthma presents for evaluation of asthma exacerbation.  For several days she has had increased cough and wheezing.  Is not responded to her usual nebulizers or rescue inhaler.  Tonight checked her temperature at home and it was 102 Fahrenheit.  Denies other associated symptoms and concerns.  Allergy to penicillins causing rash.        Review of Systems   All other systems reviewed and are negative.      Past Medical History:   Diagnosis Date    Allergic rhinitis     Allergy     Asthma     Bone disease     Chronic bronchitis     GERD (gastroesophageal reflux disease)     Thyroid disease        Allergies   Allergen Reactions    Hydrocodone-Acetaminophen Hives    Oxycodone-Acetaminophen Hives    Sulfamethoxazole-Trimethoprim Hives and Rash    Apixaban Hives    Lortab [Hydrocodone-Acetaminophen] Hives    Adhesive Tape Rash    Penicillins Rash     Other reaction(s): rash       Past Surgical History:   Procedure Laterality Date     SECTION  1987    HYSTERECTOMY  1987    THYROID BIOPSY      THYROID SURGERY         Family History   Problem Relation Age of Onset    Liver disease Brother     Lung disease Brother        Social History     Socioeconomic History    Marital status: Single   Tobacco Use    Smoking status: Never    Smokeless tobacco: Never   Vaping Use    Vaping status: Never Used   Substance and Sexual Activity    Alcohol use: No    Drug use: No    Sexual activity: Defer           Objective   Physical Exam  Vitals reviewed.   Constitutional:       General: She is not in acute distress.     Appearance: Normal appearance. She is not ill-appearing, toxic-appearing or diaphoretic.   HENT:      Head: Normocephalic and atraumatic.      Mouth/Throat:      Mouth: Mucous membranes are moist.   Cardiovascular:      Rate and Rhythm: Normal rate and regular rhythm.      Heart sounds: Normal heart sounds. No murmur heard.     No friction rub.  No gallop.   Pulmonary:      Effort: Pulmonary effort is normal. No respiratory distress.      Breath sounds: No stridor. Wheezing (Wheezing in all fields) present. No rhonchi or rales.      Comments: Mild tachypnea  Abdominal:      General: Abdomen is flat. There is no distension.      Palpations: Abdomen is soft. There is no mass.      Tenderness: There is no abdominal tenderness. There is no guarding.      Hernia: No hernia is present.   Neurological:      Mental Status: She is alert.   Psychiatric:         Mood and Affect: Mood normal.         Behavior: Behavior normal.         Thought Content: Thought content normal.         Judgment: Judgment normal.         ECG 12 Lead      Date/Time: 6/13/2025 8:06 AM    Performed by: Zoran Tomlinson DO  Authorized by: Zoran Tomlinson DO  Interpreted by ED physician  Rhythm: sinus rhythm  Rate: normal  BPM: 64  QRS axis: normal  Conduction: conduction normal  T depression: V6, V5 and V4  Clinical impression: abnormal ECG    Critical Care    Performed by: Zoran Tomlinson DO  Authorized by: Zoran Tomlinson DO    Critical care provider statement:     Critical care time (minutes):  30    Critical care start time:  6/13/2025 7:00 AM    Critical care end time:  6/13/2025 8:07 AM    Critical care time was exclusive of:  Separately billable procedures and treating other patients and teaching time    Critical care was necessary to treat or prevent imminent or life-threatening deterioration of the following conditions:  Respiratory failure    Critical care was time spent personally by me on the following activities:  Development of treatment plan with patient or surrogate, evaluation of patient's response to treatment, examination of patient, ordering and performing treatments and interventions, ordering and review of laboratory studies, ordering and review of radiographic studies, pulse oximetry, re-evaluation of patient's condition and review of old charts    I  assumed direction of critical care for this patient from another provider in my specialty: yes      Care discussed with: admitting provider               ED Course                                           Medical Decision Making  Evaluated in ED with wheezing.  Suspicious for asthma exacerbation.  Recent fever suggestive of infectious process as well.  X-ray obtained to rule out pneumonia and is negative.  Most consistent with viral process causing asthma exacerbation.  Quickly responded to treatment, on reevaluation lung sounds are much improved.  Patient request me to look in her right ear due to some recent discomfort, and exam is negative other than small amount of fluid.  Awaiting second troponin and plan for home steroids.  I assumed care for this patient at checkout.  This seems to be consistent with asthma exacerbation.  Given a second breathing treatment.  Second troponin was negative.  Patient was hypertensive and was given hydralazine.  Blood pressure did significantly improved.  Patient was started on magnesium.  when the patient was taken to the bathroom she stated that her breathing significantly worsened.  She was brought back to the room and found to be labored.  Placed on continuous albuterol and admitted to the CDU for further evaluation and treatment.    Problems Addressed:  Hypertensive urgency: complicated acute illness or injury  Moderate persistent asthma with exacerbation: complicated acute illness or injury    Amount and/or Complexity of Data Reviewed  Labs: ordered.  Radiology: ordered.  ECG/medicine tests: ordered.    Risk  Prescription drug management.        Final diagnoses:   Moderate persistent asthma with exacerbation   Hypertensive urgency       ED Disposition  ED Disposition       ED Disposition   Decision to Admit    Condition   --    Comment   --               No follow-up provider specified.         Medication List        New Prescriptions      predniSONE 50 MG tablet  Commonly  known as: DELTASONE  Take 1 tablet by mouth Daily for 4 days.               Where to Get Your Medications        These medications were sent to [facility] [id number] - [place], [place] - [address] [place] [address] - [phone]  - [phone]   [address] [place] [address] [facility] [address]      Phone: [phone]   predniSONE 50 MG tablet

## 2025-06-13 NOTE — ED NOTES
Ivette Payne    Nursing Report ED to Floor:  Mental status: A&OX4  Ambulatory status: UP W/ SB W/ CANE  Oxygen Therapy:  RA  Cardiac Rhythm: NSR  Admitted from: [facility] ED  Safety Concerns:  FALL RISK  Precautions: NA  Social Issues: NA  ED Room #:  8    ED Nurse Phone Extension - [phone] or may call [phone].      HPI:   Chief Complaint   Patient presents with    Shortness of Breath       Past Medical History:  Past Medical History:   Diagnosis Date    Allergic rhinitis     Allergy     Asthma     Bone disease     Chronic bronchitis     GERD (gastroesophageal reflux disease)     Thyroid disease         Past Surgical History:  Past Surgical History:   Procedure Laterality Date     SECTION      HYSTERECTOMY      THYROID BIOPSY      THYROID SURGERY          Admitting Doctor:   No admitting provider for patient encounter.    Consulting Provider(s):  Consults       No orders found from 5/15/2025 to 2025.             Admitting Diagnosis:   The primary encounter diagnosis was Moderate persistent asthma with exacerbation. A diagnosis of Hypertensive urgency was also pertinent to this visit.    Most Recent Vitals:   Vitals:    25 0809 25 0820 25 0821 25 0830   BP: 141/69 132/76 132/76 139/77   BP Location:       Patient Position:       Pulse: 75 68 67 66   Resp:   (!) 34    Temp:       TempSrc:       SpO2: 96% 95% 95% 95%   Weight:       Height:           Active LDAs/IV Access:   Lines, Drains & Airways       Active LDAs       Name Placement date Placement time Site Days    Peripheral IV 25 0608 20 G Left Antecubital 25  0608  Antecubital  less than 1                    Labs (abnormal labs have a star):   Labs Reviewed   COMPREHENSIVE METABOLIC PANEL - Abnormal; Notable for the following components:       Result Value    Glucose 112 (*)     All other components within normal limits    Narrative:     GFR Categories in Chronic Kidney Disease (CKD)              GFR Category           GFR (mL/min/1.73)    Interpretation  G1                    90 or greater        Normal or high (1)  G2                    60-89                Mild decrease (1)  G3a                   45-59                Mild to moderate decrease  G3b                   30-44                Moderate to severe decrease  G4                    15-29                Severe decrease  G5                    14 or less           Kidney failure    (1)In the absence of evidence of kidney disease, neither GFR category G1 or G2 fulfill the criteria for CKD.    eGFR calculation 2021 CKD-EPI creatinine equation, which does not include race as a factor   TROPONIN - Abnormal; Notable for the following components:    HS Troponin T 14 (*)     All other components within normal limits   CBC WITH AUTO DIFFERENTIAL - Abnormal; Notable for the following components:    RBC 5.61 (*)     Hemoglobin 11.4 (*)     MCV 65.2 (*)     MCH 20.3 (*)     MCHC 31.1 (*)     RDW 22.2 (*)     Neutrophil % 78.3 (*)     Lymphocyte % 11.3 (*)     All other components within normal limits    Narrative:     Appended report. These results have been appended to a previously verified report.   BNP (IN-HOUSE) - Normal    Narrative:     This assay is used as an aid in the diagnosis of individuals suspected of having heart failure. It can be used as an aid in the diagnosis of acute decompensated heart failure (ADHF) in patients presenting with signs and symptoms of ADHF to the emergency department (ED). In addition, NT-proBNP of <300 pg/mL indicates ADHF is not likely.    Age Range Result Interpretation  NT-proBNP Concentration (pg/mL:      <50             Positive            >450                   Gray                 300-450                    Negative             <300    50-75           Positive            >900                  Gray                300-900                  Negative            <300      >75             Positive            >1800                  Malin                 300-1800                  Negative            <300   RAINBOW DRAW    Narrative:     The following orders were created for panel order [place] Draw.  Procedure                               Abnormality         Status                     ---------                               -----------         ------                     Green Top (Gel)[[id number]]                                  Final result               Lavender Top[[id number]]                                     Final result               Gold Top - SST[[id number]]                                   Final result               Gray Top[[id number]]                                         Final result               Light Blue Top[[id number]]                                   Final result                 Please view results for these tests on the individual orders.   SCAN SLIDE   HIGH SENSITIVITIY TROPONIN T 1HR    Narrative:     High Sensitive Troponin T Reference Range:  <14.0 ng/L- Negative Female for AMI  <22.0 ng/L- Negative Male for AMI  >=14 - Abnormal Female indicating possible myocardial injury.  >=22 - Abnormal Male indicating possible myocardial injury.   Clinicians would have to utilize clinical acumen, EKG, Troponin, and serial changes to determine if it is an Acute Myocardial Infarction or myocardial injury due to an underlying chronic condition.        CBC AND DIFFERENTIAL    Narrative:     The following orders were created for panel order CBC & Differential.  Procedure                               Abnormality         Status                     ---------                               -----------         ------                     CBC Auto Differential[[id number]]        Abnormal            Final result               Scan Slide[[id number]]                                       Final result                 Please view results for these tests on the individual orders.   GREEN TOP   LAVENDER TOP   GOLD TOP - SST   GRAY TOP   LIGHT BLUE TOP       [facility]  Given in ED:   Medications   sodium chloride 0.9 % flush 10 mL (has no administration in time range)   magnesium sulfate 2g/50 mL (PREMIX) infusion (2 g Intravenous New Bag 6/13/25 0736)   albuterol (PROVENTIL) nebulizer solution 0.083% 2.5 mg/3mL (10 mg Nebulization New Bag 6/13/25 0832)   methylPREDNISolone sodium succinate (SOLU-Medrol) injection 90 mg (90 mg Intravenous Given 6/13/25 0631)   ipratropium-albuterol (DUO-NEB) nebulizer solution 3 mL (3 mL Nebulization Given 6/13/25 0614)   albuterol (PROVENTIL) nebulizer solution 0.083% 2.5 mg/3mL (2.5 mg Nebulization Given 6/13/25 0733)   hydrALAZINE (APRESOLINE) injection 20 mg (20 mg Intravenous Given 6/13/25 0735)     albuterol, 10 mg         Last NIH score:                                                          Dysphagia screening results:  Patient Factors Component (Dysphagia:Stroke or Rule-out)  Best Eye Response: 4-->(E4) spontaneous (06/13/25 0610)  Best Motor Response: 6-->(M6) obeys commands (06/13/25 0610)  Best Verbal Response: 5-->(V5) oriented (06/13/25 0610)  [place] Coma Scale Score: 15 (06/13/25 0610)     [place] Coma Scale:  No data recorded     CIWA:        Restraint Type:            Isolation Status:  No active isolations

## 2025-06-13 NOTE — PLAN OF CARE
Goal Outcome Evaluation:  Plan of Care Reviewed With: patient        Progress: no change  Outcome Evaluation: patient transferred from ed to cdu for observation, vitals stable, ct completed, o2 2liters, continued trending of labs, prn neb treatments.

## 2025-06-13 NOTE — H&P
[facility]   HISTORY AND PHYSICAL    Patient Name: Ivette Payne  : 1960  MRN: [id number]  Primary Care Physician:  Arlin Pacheco, APRN  Date of admission: 2025    Subjective   Subjective     Chief Complaint: Asthma exacerbation    History of Present Illness patient is a 64-year-old female with a medical history of asthma, DVT on Xarelto, hypothyroidism, hypertension presents the emergency department for evaluation of cough, congestion, dyspnea for the past 2 days.  Patient reported fever with Tmax of 102.  Patient states her granddaughter has been sick.  Patient denies chest pain history of MI.  Patient has not been on recent steroids or antibiotics.  No evidence of pneumonia in the emergency department.  Patient did receive IV magnesium, DuoNebs, steroids for asthma exacerbation but patient continued to have dyspnea and felt lightheaded and unwell when ambulated so will be observed in the CDU.  Patient did receive hydralazine for hypertension which has brought levels to normal ranges.    Review of Systems   Respiratory:  Positive for cough, shortness of breath and wheezing.    All other systems reviewed and are negative.       Personal History     Past Medical History:   Diagnosis Date    Allergic rhinitis     Allergy     Asthma     Bone disease     Chronic bronchitis     GERD (gastroesophageal reflux disease)     Thyroid disease        Past Surgical History:   Procedure Laterality Date     SECTION      HYSTERECTOMY      THYROID BIOPSY      THYROID SURGERY         Family History: family history includes Liver disease in her brother; Lung disease in her brother. Otherwise pertinent FHx was reviewed and not pertinent to current issue.    Social History:  reports that she has never smoked. She has never used smokeless tobacco. She reports that she does not drink alcohol and does not use drugs.    Home Medications:  albuterol, amLODIPine, benzonatate, carvedilol, clotrimazole,  eplerenone, famotidine, fluticasone, hydrALAZINE, hydrOXYzine, hydroCHLOROthiazide, indomethacin, ipratropium, isosorbide mononitrate, levocetirizine, levothyroxine, nebivolol, nitroglycerin, predniSONE, raNITIdine, rivaroxaban, terazosin, thyroid, and triamcinolone    Allergies:  Allergies   Allergen Reactions    Hydrocodone-Acetaminophen Hives    Oxycodone-Acetaminophen Hives    Sulfamethoxazole-Trimethoprim Hives and Rash    Apixaban Hives    Lortab [Hydrocodone-Acetaminophen] Hives    Adhesive Tape Rash    Penicillins Rash     Other reaction(s): rash       Objective    Objective     Vitals:   Temp:  [97.7 °F (36.5 °C)-98.8 °F (37.1 °C)] 97.7 °F (36.5 °C)  Heart Rate:  [55-83] 55  Resp:  [15-34] 15  BP: (126-209)/() 126/77  Flow (L/min) (Oxygen Therapy):  [3] 3    Physical Exam  Vitals and nursing note reviewed.   Constitutional:       General: She is not in acute distress.     Appearance: Normal appearance. She is not toxic-appearing.   HENT:      Head: Normocephalic and atraumatic.      Nose: Nose normal.      Mouth/Throat:      Mouth: Mucous membranes are moist.   Eyes:      Extraocular Movements: Extraocular movements intact.      Conjunctiva/sclera: Conjunctivae normal.      Pupils: Pupils are equal, round, and reactive to light.   Cardiovascular:      Rate and Rhythm: Normal rate and regular rhythm.      Pulses: Normal pulses.      Heart sounds: Normal heart sounds. No murmur heard.  Pulmonary:      Effort: Pulmonary effort is normal.      Breath sounds: No stridor. Wheezing present.   Abdominal:      General: Abdomen is flat. There is no distension.      Palpations: Abdomen is soft. There is no mass.      Tenderness: There is no abdominal tenderness. There is no guarding.   Musculoskeletal:         General: No deformity. Normal range of motion.      Cervical back: Normal range of motion and neck supple.      Right lower leg: No edema.      Left lower leg: No edema.   Skin:     General: Skin is warm  and dry.      Capillary Refill: Capillary refill takes less than 2 seconds.      Findings: No rash.   Neurological:      General: No focal deficit present.      Mental Status: She is alert and oriented to person, place, and time.      Cranial Nerves: No cranial nerve deficit.      Sensory: No sensory deficit.      Gait: Gait normal.   Psychiatric:         Mood and Affect: Mood normal.         Behavior: Behavior normal.         Result Review    Result Review:  I have personally reviewed the results from the time of this admission to 6/13/2025 17:40 EDT and agree with these findings:  [x]  Laboratory list / accordion  []  Microbiology  [x]  Radiology  [x]  EKG/Telemetry   []  Cardiology/Vascular   []  Pathology  [x]  Old records  []  Other:  Most notable findings include: Troponin 0-hour unremarkable without delta.  No leukocytosis.  Chest x-ray shows no acute findings.      Assessment & Plan   Assessment / Plan     Brief Patient Summary:  Ivette Payne is a 64 y.o. female who is admitted to the CDU for asthma exacerbation.  Patient had cough, congestion, fever for the past 2 days.  Patient was exposed to granddaughter who had a viral illness.  No evidence of pneumonia on chest x-ray in the ED.  Patient did receive steroids, DuoNeb, IV magnesium in the ED but continued to have symptoms feeling dyspneic and lightheaded with ambulating so was admitted to the observation unit.  Patient did receive IV hydralazine in the ED which has brought blood pressure to normal ranges.  Patient is maintaining oxygen saturation above 96 % on room air.  Patient had faint wheezing bilaterally auscultation.  Will observe patient with as needed DuoNeb breathing treatment, steroids.  Will further workup with respiratory viral panel as well as CTA chest PE protocol as patient does have a history of DVT in the setting dyspnea.    Active Hospital Problems:  Active Hospital Problems    Diagnosis     **Asthma exacerbation      Plan:      #Asthma Exacerbation  #Parainfluenza 3 infection  #Dyspnea  - Negative CXR  - CTA chest no evidence of PE.  Mild diffuse bronchial wall thickening with clustered areas of groundglass/subsolid micro nodularity in the right lung with findings consistent with infectious versus inflammatory process with endobronchial component.  Reactive lymph nodes. Likely due to viral infection with positive parainfluenza 3 test.  - EKG normal sinus rhythm with T wave inversions V4-V6. consistent with previous EKGs. Troponin 14 ->12  - DuoNeb every 4 hours as needed  - 125 mg IV Solu-Medrol daily  - Cardiac and O2 monitoring    Essential Hypertension  - Cardiac monitoring  - Received IV Hydralazine 20 mg in the ED  - Continue at home medications    Hypothyroidism  - Continue at home Levothyroxine    Hx DVT  - Continue at home Xarelto    VTE Prophylaxis:  Mechanical VTE prophylaxis orders are present.        CODE STATUS:    Code Status (Patient has no pulse and is not breathing): CPR (Attempt to Resuscitate)  Medical Interventions (Patient has pulse or is breathing): Full Support  Level Of Support Discussed With: Patient    Admission Status:  I believe this patient meets observation status.    STEPHANIE Calabrese

## 2025-06-14 VITALS
DIASTOLIC BLOOD PRESSURE: 87 MMHG | TEMPERATURE: 97.5 F | HEART RATE: 67 BPM | BODY MASS INDEX: 37.17 KG/M2 | OXYGEN SATURATION: 95 % | HEIGHT: 61 IN | SYSTOLIC BLOOD PRESSURE: 129 MMHG | WEIGHT: 196.9 LBS | RESPIRATION RATE: 18 BRPM

## 2025-06-14 LAB
ANION GAP SERPL CALCULATED.3IONS-SCNC: 11.5 MMOL/L (ref 5–15)
BASOPHILS # BLD AUTO: 0.01 10*3/MM3 (ref 0–0.2)
BASOPHILS NFR BLD AUTO: 0.2 % (ref 0–1.5)
BUN SERPL-MCNC: 24.3 MG/DL (ref 8–23)
BUN/CREAT SERPL: 21.3 (ref 7–25)
CALCIUM SPEC-SCNC: 9 MG/DL (ref 8.6–10.5)
CHLORIDE SERPL-SCNC: 105 MMOL/L (ref 98–107)
CO2 SERPL-SCNC: 22.5 MMOL/L (ref 22–29)
CREAT SERPL-MCNC: 1.14 MG/DL (ref 0.57–1)
DEPRECATED RDW RBC AUTO: 49.8 FL (ref 37–54)
EGFRCR SERPLBLD CKD-EPI 2021: 53.5 ML/MIN/1.73
ELLIPTOCYTES BLD QL SMEAR: NORMAL
EOSINOPHIL # BLD AUTO: 0 10*3/MM3 (ref 0–0.4)
EOSINOPHIL NFR BLD AUTO: 0 % (ref 0.3–6.2)
ERYTHROCYTE [DISTWIDTH] IN BLOOD BY AUTOMATED COUNT: 22 % (ref 12.3–15.4)
GIANT PLATELETS: NORMAL
GLUCOSE SERPL-MCNC: 122 MG/DL (ref 65–99)
HCT VFR BLD AUTO: 33.4 % (ref 34–46.6)
HGB BLD-MCNC: 10.5 G/DL (ref 12–15.9)
IMM GRANULOCYTES # BLD AUTO: 0.01 10*3/MM3 (ref 0–0.05)
IMM GRANULOCYTES NFR BLD AUTO: 0.2 % (ref 0–0.5)
LYMPHOCYTES # BLD AUTO: 1.13 10*3/MM3 (ref 0.7–3.1)
LYMPHOCYTES NFR BLD AUTO: 20.1 % (ref 19.6–45.3)
MCH RBC QN AUTO: 20.5 PG (ref 26.6–33)
MCHC RBC AUTO-ENTMCNC: 31.4 G/DL (ref 31.5–35.7)
MCV RBC AUTO: 65.2 FL (ref 79–97)
MONOCYTES # BLD AUTO: 0.64 10*3/MM3 (ref 0.1–0.9)
MONOCYTES NFR BLD AUTO: 11.4 % (ref 5–12)
NEUTROPHILS NFR BLD AUTO: 3.84 10*3/MM3 (ref 1.7–7)
NEUTROPHILS NFR BLD AUTO: 68.1 % (ref 42.7–76)
PLATELET # BLD AUTO: 229 10*3/MM3 (ref 140–450)
POLYCHROMASIA BLD QL SMEAR: NORMAL
POTASSIUM SERPL-SCNC: 3.6 MMOL/L (ref 3.5–5.2)
RBC # BLD AUTO: 5.12 10*6/MM3 (ref 3.77–5.28)
SMALL PLATELETS BLD QL SMEAR: ADEQUATE
SODIUM SERPL-SCNC: 139 MMOL/L (ref 136–145)
TARGETS BLD QL SMEAR: NORMAL
WBC MORPH BLD: NORMAL
WBC NRBC COR # BLD AUTO: 5.63 10*3/MM3 (ref 3.4–10.8)

## 2025-06-14 PROCEDURE — G0378 HOSPITAL OBSERVATION PER HR: HCPCS

## 2025-06-14 PROCEDURE — 85025 COMPLETE CBC W/AUTO DIFF WBC: CPT | Performed by: PHYSICIAN ASSISTANT

## 2025-06-14 PROCEDURE — 85007 BL SMEAR W/DIFF WBC COUNT: CPT | Performed by: PHYSICIAN ASSISTANT

## 2025-06-14 PROCEDURE — 94799 UNLISTED PULMONARY SVC/PX: CPT

## 2025-06-14 PROCEDURE — 25010000002 METHYLPREDNISOLONE PER 125 MG: Performed by: PHYSICIAN ASSISTANT

## 2025-06-14 PROCEDURE — 96376 TX/PRO/DX INJ SAME DRUG ADON: CPT

## 2025-06-14 PROCEDURE — 80048 BASIC METABOLIC PNL TOTAL CA: CPT | Performed by: PHYSICIAN ASSISTANT

## 2025-06-14 RX ORDER — BENZONATATE 100 MG/1
100 CAPSULE ORAL 3 TIMES DAILY PRN
Qty: 15 CAPSULE | Refills: 0 | Status: SHIPPED | OUTPATIENT
Start: 2025-06-14

## 2025-06-14 RX ORDER — HYDRALAZINE HYDROCHLORIDE 25 MG/1
100 TABLET, FILM COATED ORAL 3 TIMES DAILY
Status: DISCONTINUED | OUTPATIENT
Start: 2025-06-14 | End: 2025-06-14 | Stop reason: HOSPADM

## 2025-06-14 RX ORDER — LEVOTHYROXINE SODIUM 137 UG/1
137 TABLET ORAL
Status: DISCONTINUED | OUTPATIENT
Start: 2025-06-14 | End: 2025-06-14 | Stop reason: HOSPADM

## 2025-06-14 RX ORDER — ISOSORBIDE MONONITRATE 30 MG/1
60 TABLET, EXTENDED RELEASE ORAL EVERY 24 HOURS
Status: DISCONTINUED | OUTPATIENT
Start: 2025-06-14 | End: 2025-06-14 | Stop reason: HOSPADM

## 2025-06-14 RX ORDER — PREDNISONE 20 MG/1
40 TABLET ORAL DAILY
Qty: 10 TABLET | Refills: 0 | Status: SHIPPED | OUTPATIENT
Start: 2025-06-14

## 2025-06-14 RX ORDER — AMLODIPINE BESYLATE 5 MG/1
10 TABLET ORAL
Status: DISCONTINUED | OUTPATIENT
Start: 2025-06-14 | End: 2025-06-14 | Stop reason: HOSPADM

## 2025-06-14 RX ORDER — DOXYCYCLINE 100 MG/1
100 CAPSULE ORAL ONCE
Status: COMPLETED | OUTPATIENT
Start: 2025-06-14 | End: 2025-06-14

## 2025-06-14 RX ORDER — LEVOTHYROXINE SODIUM 175 UG/1
175 TABLET ORAL
Qty: 30 TABLET | Refills: 0 | Status: SHIPPED | OUTPATIENT
Start: 2025-06-14

## 2025-06-14 RX ORDER — AZITHROMYCIN 250 MG/1
TABLET, FILM COATED ORAL
Qty: 6 TABLET | Refills: 0 | Status: SHIPPED | OUTPATIENT
Start: 2025-06-14 | End: 2025-06-19

## 2025-06-14 RX ADMIN — ISOSORBIDE MONONITRATE 60 MG: 30 TABLET, EXTENDED RELEASE ORAL at 06:11

## 2025-06-14 RX ADMIN — ACETAMINOPHEN 650 MG: 325 TABLET ORAL at 08:53

## 2025-06-14 RX ADMIN — IPRATROPIUM BROMIDE AND ALBUTEROL SULFATE 3 ML: 2.5; .5 SOLUTION RESPIRATORY (INHALATION) at 02:32

## 2025-06-14 RX ADMIN — LEVOTHYROXINE SODIUM 137 MCG: 0.14 TABLET ORAL at 08:30

## 2025-06-14 RX ADMIN — IPRATROPIUM BROMIDE AND ALBUTEROL SULFATE 3 ML: 2.5; .5 SOLUTION RESPIRATORY (INHALATION) at 06:35

## 2025-06-14 RX ADMIN — HYDRALAZINE HYDROCHLORIDE 100 MG: 25 TABLET ORAL at 08:54

## 2025-06-14 RX ADMIN — AMLODIPINE BESYLATE 10 MG: 5 TABLET ORAL at 08:54

## 2025-06-14 RX ADMIN — METHYLPREDNISOLONE SODIUM SUCCINATE 125 MG: 125 INJECTION INTRAMUSCULAR; INTRAVENOUS at 06:11

## 2025-06-14 RX ADMIN — DOXYCYCLINE 100 MG: 100 CAPSULE ORAL at 08:54

## 2025-06-14 NOTE — PROGRESS NOTES
[facility]     Progress Note    Patient Name: Ivette Payne  : 1960  MRN: [id number]  Primary Care Physician:  Arlin Pacheco, ROMERO  Date of admission: 2025    Subjective   Subjective     Chief Complaint:     Shortness of Breath    Patient Reports improvement in shortness of breath and overall condition after manage in the CDU following DuoNeb breathing treatments.  Patient reports she is feeling well but is still somewhat shortness of breath on ambulation.    Review of Systems   Respiratory:  Positive for shortness of breath.        Objective   Objective     Vitals:   Temp:  [97.7 °F (36.5 °C)-98.8 °F (37.1 °C)] 98 °F (36.7 °C)  Heart Rate:  [55-83] 60  Resp:  [15-34] 18  BP: (124-209)/() 135/73  Flow (L/min) (Oxygen Therapy):  [2-3] 2    Physical Exam  Constitutional:       Appearance: Normal appearance.   HENT:      Head: Normocephalic and atraumatic.   Eyes:      Extraocular Movements: Extraocular movements intact.      Pupils: Pupils are equal, round, and reactive to light.   Cardiovascular:      Rate and Rhythm: Normal rate and regular rhythm.   Pulmonary:      Effort: Pulmonary effort is normal.      Breath sounds: Wheezing present.   Abdominal:      General: Abdomen is flat.      Palpations: Abdomen is soft.   Musculoskeletal:         General: Normal range of motion.      Right lower leg: No edema.      Left lower leg: No edema.   Skin:     General: Skin is warm and dry.      Capillary Refill: Capillary refill takes less than 2 seconds.   Neurological:      General: No focal deficit present.      Mental Status: She is alert and oriented to person, place, and time.   Psychiatric:         Mood and Affect: Mood normal.         Behavior: Behavior normal.          Result Review    Result Review:  I have personally reviewed the results from the time of this admission to 2025 05:43 EDT and agree with these findings:  [x]  Laboratory list / accordion  []  Microbiology  [x]   Radiology  []  EKG/Telemetry   []  Cardiology/Vascular   []  Pathology  []  Old records  []  Other:  Most notable findings include: Parainfluenza virus 3 on respiratory viral panel      Assessment & Plan   Assessment / Plan     Brief Patient Summary:  Ivette Payne is a 64 y.o. female  who is admitted to the CDU for asthma exacerbation.  Patient had cough, congestion, fever for the past 2 days.  Patient was exposed to granddaughter who had a viral illness.  No evidence of pneumonia on chest x-ray in the ED.  Patient did receive steroids, DuoNeb, IV magnesium in the ED but continued to have symptoms feeling dyspneic and lightheaded with ambulating so was admitted to the observation unit.  Patient did receive IV hydralazine in the ED which has brought blood pressure to normal ranges.  Patient is maintaining oxygen saturation above 96 % on room air.  Patient had faint wheezing bilaterally auscultation.  Will observe patient with as needed DuoNeb breathing treatment, steroids.  Will further workup with respiratory viral panel as well as CTA chest PE protocol as patient does have a history of DVT in the setting dyspnea.  Exam on initial assessment did show diffuse wheezing which did improve with DuoNebs.  However patient does report feeling somewhat short of breath on ambulation.  Patient will be continuously monitored and received every 4 hours DuoNebs while in the CDU     Active Hospital Problems:  Active Hospital Problems    Diagnosis     **Asthma exacerbation      Plan:   Dyspnea:  - Patient received every 4 hours DuoNebs  - Respiratory status will be continuously monitored throughout the night  - Ambulatory oxygenation will be monitored prior to discharge.    VTE Prophylaxis:  Pharmacologic & mechanical VTE prophylaxis orders are present.        CODE STATUS:    Code Status (Patient has no pulse and is not breathing): CPR (Attempt to Resuscitate)  Medical Interventions (Patient has pulse or is breathing): Full  Support  Level Of Support Discussed With: Patient    Disposition:  I expect patient to be discharged today.    Jasmeet Granados PA-C

## 2025-06-14 NOTE — DISCHARGE SUMMARY
[facility] CDU  DISCHARGE SUMMARY      Patient Name: Ivette Payne  : 1960  MRN: [id number]    Date of Admission: 2025  Date of Discharge:  25   Primary Care Physician: Arlin Pacheco APRN      Presenting Problem:   Asthma exacerbation [J45.901]    Active and Resolved Hospital Problems:  Active Hospital Problems    Diagnosis POA    **Asthma exacerbation [J45.901] Yes      Resolved Hospital Problems   No resolved problems to display.     HPI: patient is a 64-year-old female with a medical history of asthma, DVT on Xarelto, hypothyroidism, hypertension presents the emergency department for evaluation of cough, congestion, dyspnea for the past 2 days. Patient reported fever with Tmax of 102. Patient states her granddaughter has been sick. Patient denies chest pain history of MI. Patient has not been on recent steroids or antibiotics. No evidence of pneumonia in the emergency department.     Hospital Course: Patient received IV magnesium, DuoNebs, steroids in the emergency department.  Patient was observed in the CDU overnight with improved symptoms.  Patient did require 2 L nasal cannula while sleeping, but upon awakening is maintaining oxygen saturation 95% and above on room air.  Patient had negative CTA chest no evidence of pulmonary embolism.  There are findings of groundglass opacities consistent with parainfluenza 3 virus.  Patient received DuoNebs every 4 hours.  Patient received dose of IV Solu-Medrol this morning.  On reevaluation patient states she has greatly improved symptoms.  Patient was ambulated in the CDU and with pulse ox and was able to maintain oxygen saturation 95%.  Patient does appear that she gets mildly short of breath after ambulating with mild tachypnea but is not tachycardic, not hypoxic, denies lightheadedness, chest pain.  Patient would like to discharge home and trial steroids, nebulizing machine, Tessalon Perles.  Patient will be given a Z-Wyatt.   "Patient instructed to follow very closely with PCP for recheck of symptoms.  Patient states she does have an appointment with her pulmonologist on Friday.  At this time patient is medically cleared to discharge home but she is instructed to monitor symptoms closely as there is concern symptoms progress and she requires reevaluation in the hospital.    Pt observed in the CDU for 24 hours    Nurses Notes reviewed and agree, including vitals, allergies, social history and prior medical history.     REVIEW OF SYSTEMS: All systems reviewed and not pertinent unless noted.  Review of Systems   Respiratory:  Positive for cough and wheezing.    All other systems reviewed and are negative.      Past Medical History:   Diagnosis Date    Allergic rhinitis     Allergy     Asthma     Bone disease     Chronic bronchitis     GERD (gastroesophageal reflux disease)     Thyroid disease        Allergies:    Hydrocodone-acetaminophen, Oxycodone-acetaminophen, Sulfamethoxazole-trimethoprim, Apixaban, Lortab [hydrocodone-acetaminophen], Adhesive tape, and Penicillins      Past Surgical History:   Procedure Laterality Date     SECTION      HYSTERECTOMY      THYROID BIOPSY      THYROID SURGERY           Social History     Socioeconomic History    Marital status: Single   Tobacco Use    Smoking status: Never    Smokeless tobacco: Never   Vaping Use    Vaping status: Never Used   Substance and Sexual Activity    Alcohol use: No    Drug use: No    Sexual activity: Defer         Family History   Problem Relation Age of Onset    Liver disease Brother     Lung disease Brother        Objective  Physical Exam:  /87 (BP Location: Left arm, Patient Position: Lying)   Pulse 56   Temp 97.5 °F (36.4 °C) (Oral)   Resp 18   Ht 154.9 cm (61\")   Wt 89.3 kg (196 lb 14.4 oz)   LMP  (LMP Unknown)   SpO2 95%   BMI 37.20 kg/m²      Physical Exam  Vitals and nursing note reviewed.   Constitutional:       General: She is not in acute " distress.     Appearance: Normal appearance. She is not toxic-appearing.   HENT:      Head: Normocephalic and atraumatic.      Nose: Nose normal.      Mouth/Throat:      Mouth: Mucous membranes are moist.   Eyes:      Extraocular Movements: Extraocular movements intact.      Conjunctiva/sclera: Conjunctivae normal.      Pupils: Pupils are equal, round, and reactive to light.   Cardiovascular:      Rate and Rhythm: Normal rate and regular rhythm.      Pulses: Normal pulses.      Heart sounds: Normal heart sounds. No murmur heard.  Pulmonary:      Effort: No respiratory distress.      Breath sounds: No stridor. Wheezing (faint) present.      Comments: Tachypnea with ambulation  Abdominal:      General: Abdomen is flat. There is no distension.      Palpations: Abdomen is soft.   Musculoskeletal:         General: No deformity. Normal range of motion.      Cervical back: Normal range of motion and neck supple.      Right lower leg: No edema.      Left lower leg: No edema.   Skin:     General: Skin is warm and dry.      Capillary Refill: Capillary refill takes less than 2 seconds.      Findings: No rash.   Neurological:      General: No focal deficit present.      Mental Status: She is alert and oriented to person, place, and time.      Cranial Nerves: No cranial nerve deficit.      Sensory: No sensory deficit.      Gait: Gait normal.   Psychiatric:         Mood and Affect: Mood normal.         Behavior: Behavior normal.         Procedures    CT Angiogram Chest Pulmonary Embolism  Result Date: 6/13/2025  CT ANGIOGRAM CHEST PULMONARY EMBOLISM Date of Exam: 6/13/2025 11:41 AM EDT Indication: dyspnea, hx DVT. Comparison: None available. Technique: Axial CT images were obtained of the chest after the uneventful intravenous administration of 85 mL Isovue-370 utilizing pulmonary embolism protocol.  In addition, a 3-D volume rendered image was created for interpretation.  Reconstructed coronal and sagittal images were also  obtained. Automated exposure control and iterative construction methods were used. Findings: No pulmonary arterial filling defect to suggest pulmonary embolism. Normal caliber mildly atherosclerotic thoracic aorta. No pericardial effusion. Mildly enlarged right hilar lymph node. Background of calcified mediastinal and hilar lymph nodes consistent with healed granulomatous disease. Mild diffuse bronchial wall thickening. Faint areas of groundglass and subsolid nodularity in the right upper lobe and right lower lobe. No lobar consolidation. No pleural effusion or pneumothorax. No chest wall abnormality. Thoracic spondylosis. No acute or suspicious osseous lesion. Small hiatal hernia. Visualized portions of the pancreas demonstrate diffuse dilatation of the pancreatic duct measuring over 8 mm in caliber. Small hypoattenuating lesion in the peripheral right hepatic lobe is suggestive of a cyst.     Impression: Impression: No evidence of pulmonary embolism. Mild diffuse bronchial wall thickening with clustered areas of groundglass/subsolid micronodularity in the right lung. Findings consistent with an infectious/inflammatory process with endobronchial component. Mildly enlarged right hilar lymph node, nonspecific, but presumed reactive. Visualized portions of the pancreas demonstrate diffuse dilatation of the pancreatic duct. If this is not a known finding, further evaluation with pancreas protocol abdominal MRI/MRCP is recommended for further evaluation. Electronically Signed: Braeden Echavarria MD  6/13/2025 1:38 PM EDT  Workstation ID: [id number]    XR Chest 1 View  Result Date: 6/13/2025  XR CHEST 1 VW Date of Exam: 6/13/2025 6:00 AM EDT Indication: SOA triage protocol Comparison: 8/28/2024. Findings: There is no pneumothorax, pleural effusion or focal airspace consolidation. Heart size and pulmonary vasculature appear within normal limits. Regional bones appear intact.     Impression: Impression: No acute cardiopulmonary  "abnormality. Electronically Signed: Alex Pickering MD  6/13/2025 6:12 AM EDT  Workstation ID: [id number]             No results found for: \"SITE\", \"ALLENTEST\", \"PHART\", \"KEI0ZYE\", \"PO2ART\", \"INE0ZCC\", \"BASEEXCESS\", \"T8BQLNBZ\", \"HGBBG\", \"HCTABG\", \"OXYHEMOGLOBI\", \"METHHGBN\", \"CARBOXYHGB\", \"CO2CT\", \"BAROMETRIC\", \"MODALITY\", \"FIO2\"    Results from last 7 days   Lab Units 06/13/25  0719 06/13/25  0608   HSTROP T ng/L 12 14*       Results from last 7 days   Lab Units 06/14/25  0555 06/13/25  0608   WBC 10*3/mm3 5.63 6.26   HEMOGLOBIN g/dL 10.5* 11.4*   HEMATOCRIT % 33.4* 36.6   PLATELETS 10*3/mm3 229 236       Results from last 7 days   Lab Units 06/14/25  0555 06/13/25  0608   SODIUM mmol/L 139 140   POTASSIUM mmol/L 3.6 3.7   CHLORIDE mmol/L 105 105   CO2 mmol/L 22.5 22.8   BUN mg/dL 24.3* 12.3   CREATININE mg/dL 1.14* 0.92   CALCIUM mg/dL 9.0 9.6   BILIRUBIN mg/dL  --  0.5   ALK PHOS U/L  --  76   ALT (SGPT) U/L  --  6   AST (SGOT) U/L  --  16   GLUCOSE mg/dL 122* 112*       No results found for: \"CHOL\", \"CHLPL\", \"TRIG\", \"HDL\", \"LDL\", \"LDLDIRECT\"            No results found for: \"URINECX\"    @[email or web address];urinedrugscreen@    ED Disposition       ED Disposition   Decision to Admit    Condition   --    Comment   --                    Discharge Medication List:      Your medication list        START taking these medications        Instructions Last Dose Given Next Dose Due   azithromycin 250 MG tablet  Commonly known as: ZITHROMAX  Start taking on: June 14, 2025      Take 2 tablets by mouth Daily for 1 day, THEN 1 tablet Daily for 4 days.       predniSONE 50 MG tablet  Commonly known as: DELTASONE      Take 1 tablet by mouth Daily for 4 days.       predniSONE 20 MG tablet  Commonly known as: DELTASONE      Take 2 tablets by mouth Daily.              CHANGE how you take these medications        Instructions Last Dose Given Next Dose Due   benzonatate 200 MG capsule  Commonly known as: TESSALON  What changed: Another " medication with the same name was added. Make sure you understand how and when to take each.      Take 1 capsule by mouth 2 (Two) Times a Day As Needed for Cough.       benzonatate 100 MG capsule  Commonly known as: Tessalon Perles  What changed: You were already taking a medication with the same name, and this prescription was added. Make sure you understand how and when to take each.      Take 1 capsule by mouth 3 (Three) Times a Day As Needed for Cough.       levothyroxine 137 MCG tablet  Commonly known as: SYNTHROID, LEVOTHROID  What changed: Another medication with the same name was added. Make sure you understand how and when to take each.      Take 175 mcg by mouth Daily.       levothyroxine 175 MCG tablet  Commonly known as: Synthroid  What changed: You were already taking a medication with the same name, and this prescription was added. Make sure you understand how and when to take each.      Take 1 tablet by mouth Every Morning.              CONTINUE taking these medications        Instructions Last Dose Given Next Dose Due   albuterol (2.5 MG/3ML) 0.083% nebulizer solution  Commonly known as: PROVENTIL           amLODIPine 10 MG tablet  Commonly known as: NORVASC      1 tablet Daily.       Fyffe Thyroid 300 MG tablet  Generic drug: thyroid      Take 175 mg by mouth Daily.       Bystolic 20 MG tablet  Generic drug: nebivolol           carvedilol 25 MG tablet  Commonly known as: COREG      Take 1 tablet by mouth.       clotrimazole 1 % cream  Commonly known as: LOTRIMIN      Apply  topically 2 (Two) Times a Day.       eplerenone 50 MG tablet  Commonly known as: INSPRA      Take  by mouth.       famotidine 20 MG tablet  Commonly known as: PEPCID      Take 1 tablet by mouth.       fluticasone 110 MCG/ACT inhaler  Commonly known as: FLOVENT HFA      Inhale 2 puffs 2 (Two) Times a Day.       fluticasone 50 MCG/ACT nasal spray  Commonly known as: Flonase      Administer 2 sprays in each nostril DAILY        hydrALAZINE 100 MG tablet  Commonly known as: APRESOLINE      Take 1 tablet by mouth 3 (Three) Times a Day.       hydroCHLOROthiazide 25 MG tablet      Take 1 tablet by mouth Daily.       hydrOXYzine 25 MG tablet  Commonly known as: ATARAX      Take 1 tablet by mouth.       indomethacin 50 MG capsule  Commonly known as: INDOCIN           ipratropium 0.03 % nasal spray  Commonly known as: ATROVENT      2 sprays into each nostril Every 12 (Twelve) Hours.       isosorbide mononitrate 60 MG 24 hr tablet  Commonly known as: IMDUR      Daily.       levocetirizine 5 MG tablet  Commonly known as: XYZAL      Take 1 tablet by mouth Every Evening.       nitroglycerin 0.4 MG SL tablet  Commonly known as: NITROSTAT      Place 1 tablet under the tongue As Needed for Chest Pain. Take no more than 3 doses in 15 minutes.       raNITIdine 150 MG tablet  Commonly known as: ZANTAC      2 (Two) Times a Day.       rivaroxaban 20 MG tablet  Commonly known as: XARELTO      Take 1 tablet by mouth.       terazosin 5 MG capsule  Commonly known as: HYTRIN      Take 1 capsule by mouth Daily.       triamcinolone 0.1 % cream  Commonly known as: KENALOG      Apply  topically 2 (Two) Times a Day.                 Where to Get Your Medications        These medications were sent to [facility] [id number] - [address] [phone]  - [phone] [place] [address]      Phone: [phone]   azithromycin 250 MG tablet  benzonatate 100 MG capsule  levothyroxine 175 MCG tablet  predniSONE 20 MG tablet  predniSONE 50 MG tablet          STEPHANIE Calabrese   06/14/25   08:41 EDT       Time Spent on Discharge:  I spent  30  minutes on this discharge activity which included: face-to-face encounter with the patient, reviewing the data in the system, coordination of the care with the nursing staff as well as consultants, documentation, and entering orders.

## 2025-07-08 NOTE — TELEPHONE ENCOUNTER
Pa denial from [facility] will not cover Asmanex 220 per Adri we can change to Flovent 110 mcg insure will cover   No